# Patient Record
Sex: FEMALE | Race: WHITE | Employment: FULL TIME
[De-identification: names, ages, dates, MRNs, and addresses within clinical notes are randomized per-mention and may not be internally consistent; named-entity substitution may affect disease eponyms.]

---

## 2017-07-08 ENCOUNTER — HEALTH MAINTENANCE LETTER (OUTPATIENT)
Age: 37
End: 2017-07-08

## 2018-03-08 ENCOUNTER — HOSPITAL ENCOUNTER (INPATIENT)
Facility: CLINIC | Age: 38
LOS: 5 days | Discharge: PSYCHIATRIC OR SUBSTANCE USE FACILITY | DRG: 897 | End: 2018-03-13
Attending: EMERGENCY MEDICINE | Admitting: PSYCHIATRY & NEUROLOGY
Payer: COMMERCIAL

## 2018-03-08 DIAGNOSIS — F11.10 HEROIN ABUSE (H): ICD-10-CM

## 2018-03-08 PROBLEM — F19.10 CHEMICAL ABUSE (H): Status: ACTIVE | Noted: 2018-03-08

## 2018-03-08 LAB
AMPHETAMINES UR QL SCN: NEGATIVE
BARBITURATES UR QL: NEGATIVE
BENZODIAZ UR QL: NEGATIVE
CANNABINOIDS UR QL SCN: NEGATIVE
COCAINE UR QL: NEGATIVE
ETHANOL UR QL SCN: NEGATIVE
HCG UR QL: NEGATIVE
OPIATES UR QL SCN: POSITIVE

## 2018-03-08 PROCEDURE — 99285 EMERGENCY DEPT VISIT HI MDM: CPT | Mod: 25 | Performed by: EMERGENCY MEDICINE

## 2018-03-08 PROCEDURE — 80320 DRUG SCREEN QUANTALCOHOLS: CPT | Performed by: FAMILY MEDICINE

## 2018-03-08 PROCEDURE — 81025 URINE PREGNANCY TEST: CPT | Performed by: FAMILY MEDICINE

## 2018-03-08 PROCEDURE — 99285 EMERGENCY DEPT VISIT HI MDM: CPT | Mod: Z6 | Performed by: EMERGENCY MEDICINE

## 2018-03-08 PROCEDURE — HZ2ZZZZ DETOXIFICATION SERVICES FOR SUBSTANCE ABUSE TREATMENT: ICD-10-PCS | Performed by: PSYCHIATRY & NEUROLOGY

## 2018-03-08 PROCEDURE — 12800008 ZZH R&B CD ADULT

## 2018-03-08 PROCEDURE — 25000132 ZZH RX MED GY IP 250 OP 250 PS 637: Performed by: PSYCHIATRY & NEUROLOGY

## 2018-03-08 PROCEDURE — 80307 DRUG TEST PRSMV CHEM ANLYZR: CPT | Performed by: FAMILY MEDICINE

## 2018-03-08 RX ORDER — NALOXONE HYDROCHLORIDE 0.4 MG/ML
.1-.4 INJECTION, SOLUTION INTRAMUSCULAR; INTRAVENOUS; SUBCUTANEOUS
Status: DISCONTINUED | OUTPATIENT
Start: 2018-03-08 | End: 2018-03-13 | Stop reason: HOSPADM

## 2018-03-08 RX ORDER — BISACODYL 10 MG
10 SUPPOSITORY, RECTAL RECTAL DAILY PRN
Status: DISCONTINUED | OUTPATIENT
Start: 2018-03-08 | End: 2018-03-13 | Stop reason: HOSPADM

## 2018-03-08 RX ORDER — ONDANSETRON 4 MG/1
4 TABLET, ORALLY DISINTEGRATING ORAL EVERY 6 HOURS PRN
Status: DISCONTINUED | OUTPATIENT
Start: 2018-03-08 | End: 2018-03-13 | Stop reason: HOSPADM

## 2018-03-08 RX ORDER — BUPRENORPHINE 2 MG/1
2 TABLET SUBLINGUAL 4 TIMES DAILY PRN
Status: DISCONTINUED | OUTPATIENT
Start: 2018-03-08 | End: 2018-03-09

## 2018-03-08 RX ORDER — TRAZODONE HYDROCHLORIDE 50 MG/1
50 TABLET, FILM COATED ORAL
Status: DISCONTINUED | OUTPATIENT
Start: 2018-03-08 | End: 2018-03-13 | Stop reason: HOSPADM

## 2018-03-08 RX ORDER — IBUPROFEN 600 MG/1
600 TABLET, FILM COATED ORAL EVERY 6 HOURS PRN
Status: DISCONTINUED | OUTPATIENT
Start: 2018-03-08 | End: 2018-03-13 | Stop reason: HOSPADM

## 2018-03-08 RX ORDER — ALUMINA, MAGNESIA, AND SIMETHICONE 2400; 2400; 240 MG/30ML; MG/30ML; MG/30ML
30 SUSPENSION ORAL EVERY 4 HOURS PRN
Status: DISCONTINUED | OUTPATIENT
Start: 2018-03-08 | End: 2018-03-13 | Stop reason: HOSPADM

## 2018-03-08 RX ORDER — HYDROXYZINE HYDROCHLORIDE 25 MG/1
25 TABLET, FILM COATED ORAL EVERY 4 HOURS PRN
Status: DISCONTINUED | OUTPATIENT
Start: 2018-03-08 | End: 2018-03-13 | Stop reason: HOSPADM

## 2018-03-08 RX ORDER — ACETAMINOPHEN 325 MG/1
650 TABLET ORAL EVERY 4 HOURS PRN
Status: DISCONTINUED | OUTPATIENT
Start: 2018-03-08 | End: 2018-03-13 | Stop reason: HOSPADM

## 2018-03-08 RX ADMIN — TRAZODONE HYDROCHLORIDE 50 MG: 50 TABLET ORAL at 20:21

## 2018-03-08 ASSESSMENT — ENCOUNTER SYMPTOMS
COUGH: 0
EYE REDNESS: 0
DIARRHEA: 0
HEADACHES: 0
NAUSEA: 0
SORE THROAT: 0
SHORTNESS OF BREATH: 0
ARTHRALGIAS: 0
ABDOMINAL PAIN: 0
CHILLS: 0
COLOR CHANGE: 0
VOMITING: 0
FEVER: 0
RHINORRHEA: 1

## 2018-03-08 NOTE — ED PROVIDER NOTES
History     Chief Complaint   Patient presents with     Addiction Problem     Seeking detox from oxycodone/percocet, approximately 60mg/day but does not use daily, patient snorts medication.  Patient has history of snorting heroin, 2 days ago attempted IV for the first time.  Patient has been using heroin $50-$100/day for the past few months. Patient has bed reserved and intake aware.     The history is provided by the patient.     Sonja Joe is a 37 year old female with a history of substance abuse, depression, anxiety who presents to the Emergency Department today for admission to detox from heroin.  The patient reports that she called ahead and they do have a bed on hold for her.  The patient reports that she has been using about 0.5 g of heroin a day ($40-$100 worth).  Her last use was this morning.  The patient reports that she has been using heroin since last December, about 3 months.  She states that she typically snorts it, however, 2 days ago she used IV for the first time and states that at that moment she realized that she needed help.  She does report that she has a history of opiate abuse but had been sober for 2.5 years prior to December.  She reports that she had a roommate who also uses heroin so it gave her easy access and she knew where to get it from.  She does note that she has since moved out, and now lives with her parents.  She does also note that she snorted meth once a few weeks ago.  She denies the abuse of any prescription drugs.  Patient also denies use of alcohol.  Patient denies any suicidal ideation.  The patient reports that she has received treatment in the past at [facility]. She notes that her typical withdrawals or chills, vomiting, diarrhea, and anxiety.  She denies experiencing any of those withdrawal symptoms currently.  Patient's last menstrual period was 3 weeks ago.    I have reviewed the Medications, Allergies, Past Medical and Surgical History, and Social History in  the [employer] system.    Past Medical History:   Diagnosis Date     Anxiety      CARDIOVASCULAR SCREENING; LDL GOAL LESS THAN 160 3/13/2012     Depressive disorder      NO ACTIVE PROBLEMS      Substance abuse     opiates       Past Surgical History:   Procedure Laterality Date     NO HISTORY OF SURGERY         No family history on file.    Social History   Substance Use Topics     Smoking status: Current Some Day Smoker     Smokeless tobacco: Never Used      Comment: Intermoittent smoking     Alcohol use No       No current facility-administered medications for this encounter.      No current outpatient prescriptions on file.        Allergies   Allergen Reactions     No Known Drug Allergies       Review of Systems   Constitutional: Negative for chills and fever.   HENT: Positive for rhinorrhea. Negative for congestion and sore throat.    Eyes: Negative for redness.   Respiratory: Negative for cough and shortness of breath.    Cardiovascular: Negative for chest pain.   Gastrointestinal: Negative for abdominal pain, diarrhea, nausea and vomiting.   Musculoskeletal: Negative for arthralgias.   Skin: Negative for color change.   Neurological: Negative for headaches.   Psychiatric/Behavioral: Negative for suicidal ideas.   All other systems reviewed and are negative.      Physical Exam   BP: 131/89  Pulse: 75  Temp: 97.9  F (36.6  C)  Resp: 16  Weight: 135.2 kg (298 lb 1.6 oz)  SpO2: 96 %      Physical Exam   Constitutional: No distress.   HENT:   Head: Atraumatic.   Mouth/Throat: Oropharynx is clear and moist. No oropharyngeal exudate.   Eyes: Pupils are equal, round, and reactive to light. No scleral icterus.   Cardiovascular: Normal heart sounds and intact distal pulses.    Pulmonary/Chest: Breath sounds normal. No respiratory distress.   Abdominal: Soft. Bowel sounds are normal. There is no tenderness.   Musculoskeletal: She exhibits no edema or tenderness.   Skin: Skin is warm. No rash noted. She is not diaphoretic.    Track marks and bruising B AC regions, no sign of cellulitis or abscess   Psychiatric:   Casually dressed. Alert, cooperative, pleasant. Mood euthymic. No SI. No psychosis.  Seems quite forthcoming with information.  Insight good.    Nursing note and vitals reviewed.      ED Course   3:39 PM  The patient was seen and examined by Dr. Hood in Room 07.    ED Course     Procedures          Critical Care time:  none              Results for orders placed or performed during the hospital encounter of 03/08/18 (from the past 24 hour(s))   HCG qualitative urine   Result Value Ref Range    HCG Qual Urine Negative NEG^Negative          Assessments & Plan (with Medical Decision Making)   This is a very pleasant 37-year-old heroin abuser who presents to the Emergency Department for detox.  She has been through treatment before twice through [facility], had 2-1/2 years of sobriety until December 2017.  Admits to snorting heroin, estimates $40-$100 per day.  She started using/relapsed again when she was living with a roommate who is also using.  Since that time she has moved out of that environment and is currently living with her parents.  2 days ago she attempted to shoot heroin for the first time and this is what has prompted her to recognize that she needs help.  Admits to snorting meth 2 weeks ago, otherwise denies other drug use, no current suicidal ideation.  Last use was this morning.  She is pleasant and alert, cooperative, seems very forthcoming.  She did call ahead for a bed.  She is medically clear and voluntary.  I have ordered a urine tox and UPT for her.  We will admit her to detox as soon as those arrangements can be made for her.     I have reviewed the nursing notes.    I have reviewed the findings, diagnosis, plan and need for follow up with the patient.    New Prescriptions    No medications on file       Final diagnoses:   Heroin abuse   I, Rajan Vasques, am serving as a trained medical scribe to  document services personally performed by Merna Hood MD, based on the provider's statements to me.   I, Merna Hood MD, was physically present and have reviewed and verified the accuracy of this note documented by Rajan Vasques.     3/8/2018   [facility], [place], EMERGENCY DEPARTMENT     Merna Hood MD  03/08/18 0372

## 2018-03-08 NOTE — IP AVS SNAPSHOT
[facility]    [address] [place] [address]    Phone:  [phone]                                       After Visit Summary   3/8/2018    Sonja Joe    MRN: [id number]           After Visit Summary Signature Page     I have received my discharge instructions, and my questions have been answered. I have discussed any challenges I see with this plan with the nurse or doctor.    ..........................................................................................................................................  Patient/Patient Representative Signature      ..........................................................................................................................................  Patient Representative Print Name and Relationship to Patient    ..................................................               ................................................  Date                                            Time    ..........................................................................................................................................  Reviewed by Signature/Title    ...................................................              ..............................................  Date                                                            Time

## 2018-03-08 NOTE — PHARMACY-ADMISSION MEDICATION HISTORY
Admission medication history for the March 8, 2018 admission is complete.     Medication history interview sources: Patient    Medication compliance: N/A - patient not prescribed medications    Changes made to PTA medication list (reason)  Added: none  Deleted: none  Changed: none    Additional medication history information (including reliability of information, actions taken by pharmacist):  - Patient denies taking/being prescribed prescription medications and over-the-counter (OTC) products such as vitamins, sleep aids, etc.    - pt look ibuprofen yesterday, but this is not a routine medication for her    Prior to Admission medications    Not on File     Time spent: 5 minutes    Medication history completed by:   Fadia Mason, Ricardo  [facility]  Available daily from 1-9 PM: phone [phone], pager [phone]

## 2018-03-08 NOTE — IP AVS SNAPSHOT
MRN:[id number]                      After Visit Summary   3/8/2018    Sonja Joe    MRN: [id number]           Thank you!     Thank you for choosing [facility] for your care. Our goal is always to provide you with excellent care.        Patient Information     Date Of Birth          1980        Designated Caregiver       Most Recent Value    Caregiver    Will someone help with your care after discharge? no      About your hospital stay     You were admitted on:  March 8, 2018 You last received care in the:  [facility]    You were discharged on:  March 13, 2018       Who to Call     For medical emergencies, please call 911.  For non-urgent questions about your medical care, please call your primary care provider or clinic, [phone]          Attending Provider     Provider Specialty    Merna Hood MD Emergency Medicine    Sandro Cheng MD Psychiatry       Primary Care Provider Office Phone # Fax #    Marvel Beth Singh [phone]      Your next 10 appointments already scheduled     Mar 13, 2018  1:00 PM CDT   Treatment with LP/DOP ADMISSIONS   [facility] ([facility])    [address] [place] [id number] [phone]              Further instructions from your care team       Behavioral Discharge Planning and Instructions  THANK YOU FOR CHOOSING THE [place] [address] [address] [facility]    Summary: You were admitted to [facility] on 3/8/18 for detoxification from opiates.  A medical exam was performed that included lab work. You have met with a  and been approved for admission to [facility].  Please take care and make your recovery a priority Sonja! It was a pleasure working with you and the treatment team wishes you the very best in your recovery!  ~Baljit    Recommendation:  Residential Treatment, psychotherapy, sober support  group engagement and active work with a sponsor or  through [place] [facility].    Main Diagnoses: per Dr. Cheng psychiatrist.    DIAGNOSIS:  AXIS I:     1.  Opiate use, severe.   2.  Major depressive disorder, recurrent, without psychotic features.     Major Treatments, Procedures and Findings: Your opiate withdrawal was treated with buprenorphine. You are planning to continue on with a suboxone maintenance provider.    Please keep your suboxone in a safe place preferably in a locked box and out of reach of children/pets/others.     You were followed by Psychiatry and Medical. You met with Case Management to complete a chemical health assessment and for discharge planning. You were given a copy of your lab results which were reviewed with you. Please bring your lab results with you to your primary follow up appointment. Make your recovery a priority, Sonja.    Symptoms to Report: If  you experience feeling more anxiety, confusion, losing more sleep, mood declining or thoughts of suicide, notify your treatment team or notify your primary physician. IF ANY OF THE SYMPTOMS YOU ARE EXPERIENCING ARE A MEDICAL EMERGENCY CALL 911 IMMEDIATELY.    Lifestyle Adjustment: Adjust your lifestyle to get adequate sleep, relaxation, exercise and  good nutrition. Continue to develop healthy coping skills to  decrease stress and promote a sober living environment. No use of alcohol, illegal drugs or addictive medications other than what is currently prescribed. AA, NA, and  Sponsor are excellent resources for support.    There has been an increase in opioid overdoses and deaths recently . After even a short period of no drug use a persons  tolerance level to using drugs is decreased.   It is not safe to think that you can use the same amount of drugs as you did prior to coming into the hospital. Also the mixing of alcohol and/or multiple drugs increases the risk of overdose.     Returning to your drug using  "environment and contact with your drug using friends puts you at high risk for certain relapse.     Keeping hands clean is one of the most important steps we can take to avoid getting sick and spreading germs to others.  Please wash your hands frequently.     Medical Provider Follow Up: you are currently in the process of obtaining a suboxone maintaince dr and are awaiting calls back. You stated you will be following up with this at [facility] plus    Please take this discharge folder with you to all your follow up appointments as it contains your lab results, diagnosis, medication list and discharge recommendations.     Support Group:  AA/NA and Sponsor contact/support    Resources:  Crisis Intervention: 904.888.1162 or 721-277-1374 (TTY: 268.629.2875).  Call anytime for help.  Suicide Awareness Voices of Education (SAVE) (www.save.org): 032-942-PBRR (4633)  National Suicide Prevention Line (www.mentalhealthmn.org): 599-292-FELV (4015)  National [place] on Mental Illness (www.[place].will.org): 572.649.8729 or 333-375-8835.  Alcoholics Anonymous (www.alcoholics-anonymous.org): Or local information can be found 24 hours a day at:   AA Intergroup service office in [place] (http://www.aastpaul.org/) [phone]  AA Intergroup service office in [facility]: [phone]. (http://www.a[place].org/)  Narcotics Anonymous (www.nam[place].org)1-136.810.1867   [facility] Behavioral Intake [phone]    [employer] is a suicide prevention resource for residents in [place].  We can help you with relationship issues, general mental health, and suicide.  We are free, confidential, and here for you 24/7/365.  1. Text \"Life\" to 73401  2. Wait for a trained crisis counselor to respond to your text  3. Respond and start the conversation about what you are concerned about   If you believe a person s life is in imminent danger, call 911.    [facility] ([facility])  [facility] connects people " "seeking recovery to resources that help foster and sustain long-term recovery.  Whether you are seeking resources for treatment, transportation, housing, job training, education, health care or other pathways to recovery, [facility] is a great place to start.  [phone]. Www.[facility].org    General Medication Instructions:   See your medication sheet(s) for instructions. Take all medicines as directed.  Make no changes unless your doctor directs them. Go to all your doctor visits. Be sure to have all your required lab tests. This way, your medicines can be refilled in timely fashion. Do not use any drugs not prescribed by your provider. AA/NA and Sponsors are excellent resources for support. Avoid alcohol.    Please Note: If you have any questions at anytime after you are discharged please call the [facility] detox unit 3AW unit at [phone].  [facility], Behavioral Intake [phone]  Please take this discharge folder with you to all your follow up appointments, it contains your lab results, diagnosis, medication list and discharge recommendations.      Medical Records [phone]      THANK YOU FOR CHOOSING THE [facility]                                 Pending Results     No orders found from 3/6/2018 to 3/9/2018.            Statement of Approval     Ordered          03/13/18 1019  I have reviewed and agree with all the recommendations and orders detailed in this document.  EFFECTIVE NOW     Approved and electronically signed by:  Sandro Cheng MD             Admission Information     Date & Time Provider Department Dept. Phone    3/8/2018 Sandro Cheng MD [facility] [phone]      Your Vitals Were     Blood Pressure Pulse Temperature Respirations Height Weight    124/75 72 97.1  F (36.2  C) (Oral) 16 1.753 m (5' 9\") 135.2 kg (298 lb)    Last Period Pulse Oximetry BMI (Body Mass Index)       " "      02/15/2018 (Approximate) 93% 44.01 kg/m2         [employer] Information     [employer] lets you send messages to your doctor, view your test results, renew your prescriptions, schedule appointments and more. To sign up, go to [email or web address] . Click on \"Log in\" on the left side of the screen, which will take you to the Welcome page. Then click on \"Sign up Now\" on the right side of the page.     You will be asked to enter the access code listed below, as well as some personal information. Please follow the directions to create your username and password.     Your access code is: [email or web address]  Expires: 2018 12:53 PM     Your access code will  in 90 days. If you need help or a new code, please call your [place] clinic or [phone].        Care EveryWhere ID     This is your Care EveryWhere ID. This could be used by other organizations to access your [place] medical records  [id number]        Equal Access to Services     KHOI LOGAN : Hadii davina curry hadamandeepo Soalex, waaxda luqadaha, qaybta kaalmada adealejoyalaury, rubio perez . So [facility] [phone].    ATENCIÓN: Si habla español, tiene a dominique disposición servicios gratuitos de asistencia lingüística. Llame al [phone].    We comply with applicable federal civil rights laws and [place] laws. We do not discriminate on the basis of race, color, national origin, age, disability, sex, sexual orientation, or gender identity.               Review of your medicines      START taking        Dose / Directions    buprenorphine HCl-naloxone HCl 8-2 MG per film   Commonly known as:  SUBOXONE   Used for:  Heroin abuse        Dose:  1 Film   Place 1 Film under the tongue daily   Quantity:  7 Film   Refills:  0       FLUoxetine 10 MG tablet   Commonly known as:  PROzac   Used for:  Heroin abuse        Dose:  30 mg   Take 3 tablets (30 mg) by mouth daily   Quantity:  90 tablet   Refills:  0       nicotine polacrilex 2 MG gum "   Commonly known as:  NICORETTE   Used for:  Heroin abuse        Dose:  2-4 mg   Place 1-2 each (2-4 mg) inside cheek every hour as needed for smoking cessation   Quantity:  30 tablet   Refills:  1       traZODone 50 MG tablet   Commonly known as:  DESYREL   Used for:  Heroin abuse        Dose:  50 mg   Take 1 tablet (50 mg) by mouth nightly as needed for sleep   Quantity:  30 tablet   Refills:  0            Where to get your medicines      These medications were sent to [facility] [place], [place] - [address]  [address] [facility], [facility] [address]     Phone:  [phone]     FLUoxetine 10 MG tablet    nicotine polacrilex 2 MG gum    traZODone 50 MG tablet         Some of these will need a paper prescription and others can be bought over the counter. Ask your nurse if you have questions.     Bring a paper prescription for each of these medications     buprenorphine HCl-naloxone HCl 8-2 MG per film                Protect others around you: Learn how to safely use, store and throw away your medicines at www.disposemymeds.org.             Medication List: This is a list of all your medications and when to take them. Check marks below indicate your daily home schedule. Keep this list as a reference.      Medications           Morning Afternoon Evening Bedtime As Needed    buprenorphine HCl-naloxone HCl 8-2 MG per film   Commonly known as:  SUBOXONE   Place 1 Film under the tongue daily                                FLUoxetine 10 MG tablet   Commonly known as:  PROzac   Take 3 tablets (30 mg) by mouth daily   Last time this was given:  30 mg on 3/13/2018  9:10 AM                                nicotine polacrilex 2 MG gum   Commonly known as:  NICORETTE   Place 1-2 each (2-4 mg) inside cheek every hour as needed for smoking cessation   Last time this was given:  4 mg on 3/10/2018  8:45 AM                                traZODone 50 MG tablet   Commonly known as:  DESYREL   Take 1 tablet  (50 mg) by mouth nightly as needed for sleep   Last time this was given:  50 mg on 3/12/2018  9:33 PM

## 2018-03-08 NOTE — ED NOTES
Patient seeking detox from opiates, reports infrequent snorting of oxycodone/percocet (approximately 60mg/day when used), reports daily use of heroin for the past 3 months, typically snorting but the past 2 days has attempted to use IV.  Patient reports last use of heroin was around 0830 this morning, denies SI/HI.    Patient has detox bed reserved and intake aware.

## 2018-03-09 LAB
ALBUMIN SERPL-MCNC: 3.5 G/DL (ref 3.4–5)
ALP SERPL-CCNC: 130 U/L (ref 40–150)
ALT SERPL W P-5'-P-CCNC: 19 U/L (ref 0–50)
ANION GAP SERPL CALCULATED.3IONS-SCNC: 8 MMOL/L (ref 3–14)
AST SERPL W P-5'-P-CCNC: 9 U/L (ref 0–45)
BILIRUB SERPL-MCNC: 0.9 MG/DL (ref 0.2–1.3)
BUN SERPL-MCNC: 6 MG/DL (ref 7–30)
CALCIUM SERPL-MCNC: 8.6 MG/DL (ref 8.5–10.1)
CHLORIDE SERPL-SCNC: 108 MMOL/L (ref 94–109)
CO2 SERPL-SCNC: 23 MMOL/L (ref 20–32)
CREAT SERPL-MCNC: 0.66 MG/DL (ref 0.52–1.04)
ERYTHROCYTE [DISTWIDTH] IN BLOOD BY AUTOMATED COUNT: 14.7 % (ref 10–15)
GFR SERPL CREATININE-BSD FRML MDRD: >90 ML/MIN/1.7M2
GLUCOSE SERPL-MCNC: 96 MG/DL (ref 70–99)
HCT VFR BLD AUTO: 40.8 % (ref 35–47)
HCV AB SERPL QL IA: NONREACTIVE
HGB BLD-MCNC: 12.6 G/DL (ref 11.7–15.7)
HIV 1+2 AB+HIV1 P24 AG SERPL QL IA: NONREACTIVE
MCH RBC QN AUTO: 24.9 PG (ref 26.5–33)
MCHC RBC AUTO-ENTMCNC: 30.9 G/DL (ref 31.5–36.5)
MCV RBC AUTO: 81 FL (ref 78–100)
PLATELET # BLD AUTO: 301 10E9/L (ref 150–450)
POTASSIUM SERPL-SCNC: 4 MMOL/L (ref 3.4–5.3)
PROT SERPL-MCNC: 7.2 G/DL (ref 6.8–8.8)
RBC # BLD AUTO: 5.07 10E12/L (ref 3.8–5.2)
SODIUM SERPL-SCNC: 139 MMOL/L (ref 133–144)
TSH SERPL DL<=0.005 MIU/L-ACNC: 1.13 MU/L (ref 0.4–4)
WBC # BLD AUTO: 9 10E9/L (ref 4–11)

## 2018-03-09 PROCEDURE — 99207 ZZC DOWN CODE DUE TO SUBSEQUENT EXAM: CPT | Performed by: PSYCHIATRY & NEUROLOGY

## 2018-03-09 PROCEDURE — 12800012 ZZH R&B CD MH INTERMEDIATE ADULT

## 2018-03-09 PROCEDURE — 25000132 ZZH RX MED GY IP 250 OP 250 PS 637: Performed by: PSYCHIATRY & NEUROLOGY

## 2018-03-09 PROCEDURE — 80053 COMPREHEN METABOLIC PANEL: CPT | Performed by: PSYCHIATRY & NEUROLOGY

## 2018-03-09 PROCEDURE — 86803 HEPATITIS C AB TEST: CPT | Performed by: PSYCHIATRY & NEUROLOGY

## 2018-03-09 PROCEDURE — 85027 COMPLETE CBC AUTOMATED: CPT | Performed by: PSYCHIATRY & NEUROLOGY

## 2018-03-09 PROCEDURE — 99221 1ST HOSP IP/OBS SF/LOW 40: CPT | Mod: AI | Performed by: PSYCHIATRY & NEUROLOGY

## 2018-03-09 PROCEDURE — 36415 COLL VENOUS BLD VENIPUNCTURE: CPT | Performed by: PSYCHIATRY & NEUROLOGY

## 2018-03-09 PROCEDURE — 99222 1ST HOSP IP/OBS MODERATE 55: CPT | Performed by: NURSE PRACTITIONER

## 2018-03-09 PROCEDURE — 87389 HIV-1 AG W/HIV-1&-2 AB AG IA: CPT | Performed by: PSYCHIATRY & NEUROLOGY

## 2018-03-09 PROCEDURE — 99207 ZZC CONSULT E&M CHANGED TO INITIAL LEVEL: CPT | Performed by: NURSE PRACTITIONER

## 2018-03-09 PROCEDURE — 84443 ASSAY THYROID STIM HORMONE: CPT | Performed by: PSYCHIATRY & NEUROLOGY

## 2018-03-09 RX ORDER — LOPERAMIDE HCL 2 MG
2 CAPSULE ORAL 4 TIMES DAILY PRN
Status: DISCONTINUED | OUTPATIENT
Start: 2018-03-09 | End: 2018-03-13 | Stop reason: HOSPADM

## 2018-03-09 RX ORDER — BUPRENORPHINE 2 MG/1
2 TABLET SUBLINGUAL 4 TIMES DAILY
Status: DISCONTINUED | OUTPATIENT
Start: 2018-03-09 | End: 2018-03-13 | Stop reason: HOSPADM

## 2018-03-09 RX ORDER — SIMETHICONE 80 MG
80 TABLET,CHEWABLE ORAL EVERY 6 HOURS PRN
Status: DISCONTINUED | OUTPATIENT
Start: 2018-03-09 | End: 2018-03-13 | Stop reason: HOSPADM

## 2018-03-09 RX ORDER — FLUOXETINE 10 MG/1
20 TABLET, FILM COATED ORAL DAILY
Status: DISCONTINUED | OUTPATIENT
Start: 2018-03-09 | End: 2018-03-13

## 2018-03-09 RX ADMIN — BUPRENORPHINE HCL 2 MG: 2 TABLET SUBLINGUAL at 08:54

## 2018-03-09 RX ADMIN — IBUPROFEN 600 MG: 600 TABLET ORAL at 05:37

## 2018-03-09 RX ADMIN — TRAZODONE HYDROCHLORIDE 50 MG: 50 TABLET ORAL at 20:36

## 2018-03-09 RX ADMIN — BUPRENORPHINE HCL 2 MG: 2 TABLET SUBLINGUAL at 11:43

## 2018-03-09 RX ADMIN — HYDROXYZINE HYDROCHLORIDE 25 MG: 25 TABLET ORAL at 05:37

## 2018-03-09 RX ADMIN — BUPRENORPHINE HCL 2 MG: 2 TABLET SUBLINGUAL at 20:36

## 2018-03-09 RX ADMIN — BUPRENORPHINE HCL 2 MG: 2 TABLET SUBLINGUAL at 16:15

## 2018-03-09 RX ADMIN — FLUOXETINE 20 MG: 10 TABLET, FILM COATED ORAL at 11:41

## 2018-03-09 RX ADMIN — NICOTINE POLACRILEX 4 MG: 2 GUM, CHEWING ORAL at 18:19

## 2018-03-09 ASSESSMENT — ACTIVITIES OF DAILY LIVING (ADL)
DRESS: STREET CLOTHES
ORAL_HYGIENE: INDEPENDENT
GROOMING: INDEPENDENT
LAUNDRY: WITH SUPERVISION

## 2018-03-09 NOTE — PLAN OF CARE
"Problem: Substance Withdrawal  Goal: Substance Withdrawal  Signs and symptoms of listed problems will be absent or manageable.   Outcome: No Change  S: Sonja \"Debra\" is a 38 yo female who comes to  seeking detox from opioids. She states that she relapsed Dec 2017 after 2 years of recovery.  B:  She has been snorting Oxycontin 60 mg or Heroin 1/2 gram daily, she tried IV heroin 2 days ago for the first time and \"grossed out and knew I needed help.\" Last opiate use 3/8/18 at 0830.  She has been to [facility] 2 2013 (sober 1 year) and 2015 (sober 2 years). 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 She has a 5 year old son whom is with his father. A: Patient does not appear to be in serious opiate withdrawal at this time AEB opiate score of 4.  She endorses depression, denies SI or any history of SI, h/o anxiety currently denies.        R: Obtained admission orders. Provided with dinner. Educated on Unit routines and expectations.            "

## 2018-03-09 NOTE — CONSULTS
Internal Medicine Consult - Initial Visit       Sonja Joe MRN# [id number]   YOB: 1980 Age: 37 year old   Date of Admission: 3/8/2018  PCP: Marvel Singh  Date of Service: 3/9/2018    Referring Provider: Sandro Cheng MD  Reason for Consult: Medical co-management of detox          Assessment and Recommendations:   Sonja Joe is a 37 year old female with a history of depression and polysubstance abuse admitted for detoxification from opiates.      # Detoxification from opiates - Using about 1/2 g heroin daily intranasally.  Used IV for the first time on 3/7.  No hx of sharing needles.     - HIV, HCV pending   - Further management per Psychiatry     # Depression - Mood stable.    - Continue PTA Prozac    # Abdominal pain  # Diarrhea  Likely d/t withdrawals.  No recent abx use.  No fevers.   - Imodium, simethicone PRN   - Please contact medicine if symptoms persist or if accompanied by fevers       Medicine will follow along pending lab results.  Thank you for this consult.     Pricila Morrison CNP  Hospitalist Service   Pager: [phone]       Reason for Visit:   Medical co-management of detox          History of Present Illness:   History is obtained from the patient and medical record.     This patient is a 37 year old female with a history of depression and polysubstance abuse admitted for detoxification from opiates.      Internal Medicine service was asked to see patient for assistance with medical co-management of detoxification from opiates.  Sonja tells me that she has been using about 1/2 gram of heroin for the last month.  She had exclusively been snorting it until Wednesday (3/7) this week when she tried IV heroin for the first time.  This prompted her to seek treatment.  She denies sharing needles on this occasion.      Today she reports diarrhea and abdominal pain which she attributes to withdrawals.  She denies any flu symptoms.  She denies fevers, chills,  headaches, dizziness, cough, chest pain, dyspnea, nausea, vomiting, constipation, and dysuria.          Review of Systems:   A 10 point ROS was performed and negative unless otherwise noted in HPI.           Past Medical History:   Reviewed and updated in Epic.  Past Medical History:   Diagnosis Date     Anxiety      CARDIOVASCULAR SCREENING; LDL GOAL LESS THAN 160 3/13/2012     Depressive disorder      NO ACTIVE PROBLEMS      Substance abuse     opiates             Past Surgical History:   Reviewed and updated in Epic.  Past Surgical History:   Procedure Laterality Date     NO HISTORY OF SURGERY               Social History:   Reviewed and updated in [facility].  Social History     Social History     Marital status: Legally      Spouse name: Rajan     Number of children: 1     Years of education: N/A     Occupational History           [employer]     Social History Main Topics     Smoking status: Current Some Day Smoker     Smokeless tobacco: Never Used      Comment: Intermoittent smoking     Alcohol use No     Drug use: Yes     Special: Opiates, IV      Comment: typically snorts opiates but has attempted to inject     Sexual activity: Yes     Partners: Male     Other Topics Concern     Parent/Sibling W/ Cabg, Mi Or Angioplasty Before 65f 55m? No     Social History Narrative              Family History:   Reviewed and updated in Epic.  No family history on file.          Allergies:     Allergies   Allergen Reactions     No Known Drug Allergies              Medications:     Current Facility-Administered Medications   Medication     FLUoxetine (PROzac) tablet 20 mg     hydrOXYzine (ATARAX) tablet 25 mg     acetaminophen (TYLENOL) tablet 650 mg     alum & mag hydroxide-simethicone (MYLANTA ES/MAALOX  ES) suspension 30 mL     magnesium hydroxide (MILK OF MAGNESIA) suspension 30 mL     bisacodyl (DULCOLAX) Suppository 10 mg     traZODone (DESYREL) tablet 50 mg     ondansetron  "(ZOFRAN-ODT) ODT tab 4 mg     ibuprofen (ADVIL/MOTRIN) tablet 600 mg     buprenorphine (SUBUTEX) sublingual tablet 2 mg     naloxone (NARCAN) injection 0.1-0.4 mg            Physical Exam:   Blood pressure 130/87, pulse 85, temperature 98.5  F (36.9  C), temperature source Tympanic, resp. rate 16, height 1.753 m (5' 9\"), weight 135.2 kg (298 lb), last menstrual period 02/15/2018, SpO2 93 %.  Body mass index is 44.01 kg/(m^2).    GENERAL: Alert and oriented x 3. Well nourished, well developed.  Large body habitus.  No acute distress.    HEENT: Normocephalic, atraumatic. Anicteric sclera. Mucous membranes moist.   CV: RRR. S1, S2. No murmurs appreciated.   RESPIRATORY: Effort normal on room air. Lungs CTAB with no wheezing, rales, or rhonchi.   GI: Abdomen soft and non distended, bowel sounds present x all 4 quadrants. No tenderness, rebound, or guarding.   NEUROLOGICAL: No focal deficits. Follows commands.  Strength 5/5 in upper and lower extremities.   MUSCULOSKELETAL: No joint swelling or tenderness. Moves all extremities.   EXTREMITIES: No gross deformities. No peripheral edema. Intact bilateral pedal pulses.   SKIN: Grossly warm, dry, and intact. No jaundice. No rashes.             Data:   I personally reviewed the following studies:    ROUTINE IP LABS (Last four results)  CMP     Recent Labs  Lab 03/09/18  0802      POTASSIUM 4.0   CHLORIDE 108   CO2 23   ANIONGAP 8   GLC 96   BUN 6*   CR 0.66   MAURICIO 8.6   PROTTOTAL 7.2   ALBUMIN 3.5   BILITOTAL 0.9   ALKPHOS 130   AST 9   ALT 19     CBC   Recent Labs  Lab 03/09/18  0802   WBC 9.0   RBC 5.07   HGB 12.6   HCT 40.8   MCV 81   MCH 24.9*   MCHC 30.9*   RDW 14.7        INR No lab results found in last 7 days.      Unresulted Labs Ordered in the Past 30 Days of this Admission     Date and Time Order Name Status Description    3/9/2018 0030 HIV Antigen Antibody Combo In process     3/9/2018 0030 Hepatitis C Screen Reflex to HCV RNA Quant and Genotype In " process

## 2018-03-09 NOTE — PROGRESS NOTES
Case Management Note  3/9/2018    Writer met with pt to initiate discharge planning. Pt reports she definitely plans to go to treatment. She is hoping to have a Rule 25 completed and follow those recommendations for treatment. Pt reports she last had a assessment completed at [facility] in 2015. Writer encouraged pt to complete her intake paperwork for assessment and referral purposes. Pt reports she is not feeling well, but plans to get started on this once she starts feeling better. Case management to continue.    Weekend  - Please check in with pt to see if she has completed her intake paperwork. If completed, complete Rule 25 assessment and make referral to residential treatment program.    Baljit Knight MA, LADC

## 2018-03-09 NOTE — PLAN OF CARE
Problem: Substance Withdrawal  Goal: Substance Withdrawal  Signs and symptoms of listed problems will be absent or manageable. 1. Detoxification from opiates using buprenorphine   2. Pt will complete assessment paperwork  3. Pt will meet with  for evaluation and discharge planning  4. Pt oral intake will be greater than 75% of meals to meet estimated needs  5. Pt will be provided opioid prevention resources  6. Physical examination by MD and Lab evaluation     Pt being monitored for opiate withdrawal. Mild withdrawal symptoms in am. Pt received first dose of buprenorphine 2 mg in am. Pt reported receiving relief of many opiate withdrawal symptoms. Pt will receive buprenorphine 2 mg QID today. Pt states she is interested in going to [facility] treatment and doing suboxone maintenance. Pt reports feeling anxious rating her anxiety level a 7-8 on a 0-10 severe scale. Pt was started back on Prozac. Pt out on unit working on assessment paperwork.

## 2018-03-09 NOTE — PLAN OF CARE
Behavioral Team Discussion: (3/9/2018)    Continued Stay Criteria/Rationale: Patient admitted for Chemical Use Issues.  Plan: The following services will be provided to the patient; psychiatric assessment, medication management, therapeutic milieu, individual and group support, and skills groups.   Participants: 3A Provider: Dr. Sandro Cheng MD; 3A RN's: Renetta Santoyo, RN, Eliseo Dewey, RN, Darlin Greenwood, RN and Sally Selisker, RN; 3A CM's: Natalia Jolley, Lesley Burns  and Baljit Knight.  Summary/Recommendation: Providers will assess today for treatment recommendations, discharge planning, and aftercare plans. CM will meet with pt for discharge planning.   Medical/Physical: Deferred (see medical notes).  Precautions:   Behavioral Orders   Procedures     Code 1 - Restrict to Unit     Routine Programming     As clinically indicated     Status 15     Every 15 minutes.     Withdrawal precautions     Rationale for change in precautions or plan: N/A  Progress: Improving.

## 2018-03-09 NOTE — H&P
"Admitted:     03/08/2018      INITIAL ADMISSION NOTE      IDENTIFYING INFORMATION:  The patient is a 37-year-old  female.  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  She is .  She has 1 child.      CHIEF COMPLAINT:  \"In and out of sobriety.\"      HISTORY OF PRESENT ILLNESS:  The patient began to abuse opiates at the age of 28.  It was oxys and by 30 it was a problem.  She was in treatment at age of 32.  She was sober for 1 year.  She went to [facility] ED in 2015.  She was also in [facility] ED and sober this time for 2 years.  She was never on Suboxone maintenance, but she was living with her roommate and at that time her roommate was using and she relapsed.  She has been using oxycodone, Percocet, approximately 60 mg per day.  She snorts them.  She has also been snorting heroin.  Two days ago she attempted IV and she realized that she needs to get help and brought herself here.  She has tolerance, withdrawal including chills, vomiting, diarrhea.  She has progressive use, loss of control, tried to use despite having negative consequences, marital difficulty, job difficulty, house, money.  She smokes 1 pack a week.  She does not beasley, does not use any other drugs.  She does have chronic depression.  She says her depression is worse right now, where she has loss of giovanna, appetite is down, isolates, energy is down.  She spends a lot of time in bed.  She previously did take Prozac and Wellbutrin.  She denies any suicidal or homicidal ideation.  Denied any auditory or visual hallucinations, denies any tiburcio, denies any PTSD.  She is now living at home and it is significantly hard for her because she describes that her father was not good to her.      PAST PSYCHIATRIC HISTORY:  She was never psychiatrically hospitalized.  She was in 2 treatments as described above.      PAST MEDICAL HISTORY:  A 10-point systems reviewed is negative.  However, she does have symptoms of opiate withdrawal, restlessness, she is " having nausea.       VITAL SIGNS:  The patient's vitals are as below:  Temperature of 98.5, pulse of 85, respiratory rate of 16, blood pressure of 130/87.      FAMILY HISTORY:  Three of her grandparents  from alcoholism.  Depression runs in her mother's side of the family.  Paternal cousin committed suicide.      SOCIAL HISTORY:  Born in [place].  Denied any abuse, living at home.  She has +12 years of education.      MENTAL STATUS EXAMINATION:  The patient is a 37-year-old  female who appears her stated age, adequate grooming, adequate hygiene, maintains good eye contact, cooperative.  No psychomotor abnormalities.  No gait problems.  Mood is depressed.  Affect is congruent.  Speech is spontaneous, normal rate.  Does not have any suicidal or homicidal ideation, denies any auditory or visual hallucinations.  Alert and oriented x3.  Recent and remote memory, language, fund of knowledge are all adequate.  No loose associations.  The patient does not have any suicidal or homicidal ideation, plan or intent.      DIAGNOSIS:  AXIS I:     1.  Opiate use dx , severe.   2.  Major depressive disorder, recurrent, without psychotic features.      PLAN:  The patient wants to be detoxed off opiates using buprenorphine.  She is looking into Suboxone maintenance.  The patient wants to do some form of treatment.         JAISON CHAUDHARI MD             D: 2018   T: 2018   MT: FRANCISCO      Name:     CARLITA CHARLES   MRN:      [id number]        Account:      [id number]   :      1980        Admitted:     2018                   Document: [id number]

## 2018-03-09 NOTE — PROGRESS NOTES
03/08/18 1811   Patient Belongings   Did you bring any home meds/supplements to the hospital?  Yes   Disposition of meds  Sent to security/pharmacy per site process   Disposition of Belongings Other (see comment)   Belongings Search Yes   Clothing Search Yes   Second Staff Phu RN, Bina   General Info Comment See Notes     BIN: Tote bag, purse w/ loose papers, loose coins, boots, hat, jacket, black tote bag, blue hoodie with strings, pink hoodie with strings    Medication # [id number] delivered to nurse    Locked drawer: Phone,     Romel Sent to security-Knife, cutter  A               Admission:  I am responsible for any personal items that are not sent to the safe or pharmacy.  [facility] is not responsible for loss, theft or damage of any property in my possession.    Signature:  _________________________________ Date: _______  Time: _____                                              Staff Signature:  ____________________________ Date: ________  Time: _____      2nd Staff person, if patient is unable/unwilling to sign:    Signature: ________________________________ Date: ________  Time: _____     Discharge:  [facility] has returned all of my personal belongings:    Signature: _________________________________ Date: ________  Time: _____                                          Staff Signature:  ____________________________ Date: ________  Time: _____

## 2018-03-10 LAB
ALBUMIN UR-MCNC: 10 MG/DL
APPEARANCE UR: CLEAR
BACTERIA #/AREA URNS HPF: ABNORMAL /HPF
BILIRUB UR QL STRIP: NEGATIVE
COLOR UR AUTO: YELLOW
GLUCOSE UR STRIP-MCNC: NEGATIVE MG/DL
HGB UR QL STRIP: NEGATIVE
KETONES UR STRIP-MCNC: NEGATIVE MG/DL
LEUKOCYTE ESTERASE UR QL STRIP: ABNORMAL
MUCOUS THREADS #/AREA URNS LPF: PRESENT /LPF
NITRATE UR QL: NEGATIVE
PH UR STRIP: 6 PH (ref 5–7)
RBC #/AREA URNS AUTO: 2 /HPF (ref 0–2)
SOURCE: ABNORMAL
SP GR UR STRIP: 1.02 (ref 1–1.03)
SQUAMOUS #/AREA URNS AUTO: <1 /HPF (ref 0–1)
UROBILINOGEN UR STRIP-MCNC: 2 MG/DL (ref 0–2)
WBC #/AREA URNS AUTO: 1 /HPF (ref 0–5)

## 2018-03-10 PROCEDURE — 25000132 ZZH RX MED GY IP 250 OP 250 PS 637: Performed by: PSYCHIATRY & NEUROLOGY

## 2018-03-10 PROCEDURE — 12800012 ZZH R&B CD MH INTERMEDIATE ADULT

## 2018-03-10 PROCEDURE — 81001 URINALYSIS AUTO W/SCOPE: CPT | Performed by: PSYCHIATRY & NEUROLOGY

## 2018-03-10 PROCEDURE — 25000125 ZZHC RX 250: Performed by: PSYCHIATRY & NEUROLOGY

## 2018-03-10 RX ADMIN — HYDROXYZINE HYDROCHLORIDE 25 MG: 25 TABLET ORAL at 23:08

## 2018-03-10 RX ADMIN — FLUOXETINE 20 MG: 10 TABLET, FILM COATED ORAL at 08:45

## 2018-03-10 RX ADMIN — ONDANSETRON 4 MG: 4 TABLET, ORALLY DISINTEGRATING ORAL at 07:08

## 2018-03-10 RX ADMIN — TRAZODONE HYDROCHLORIDE 50 MG: 50 TABLET ORAL at 23:08

## 2018-03-10 RX ADMIN — BUPRENORPHINE HCL 2 MG: 2 TABLET SUBLINGUAL at 20:38

## 2018-03-10 RX ADMIN — NICOTINE POLACRILEX 4 MG: 2 GUM, CHEWING ORAL at 08:45

## 2018-03-10 RX ADMIN — BUPRENORPHINE HCL 2 MG: 2 TABLET SUBLINGUAL at 11:55

## 2018-03-10 RX ADMIN — TRAZODONE HYDROCHLORIDE 50 MG: 50 TABLET ORAL at 21:37

## 2018-03-10 RX ADMIN — BUPRENORPHINE HCL 2 MG: 2 TABLET SUBLINGUAL at 16:28

## 2018-03-10 RX ADMIN — BUPRENORPHINE HCL 2 MG: 2 TABLET SUBLINGUAL at 08:45

## 2018-03-10 ASSESSMENT — ACTIVITIES OF DAILY LIVING (ADL)
DRESS: STREET CLOTHES
LAUNDRY: WITH SUPERVISION
ORAL_HYGIENE: INDEPENDENT
GROOMING: INDEPENDENT

## 2018-03-10 NOTE — PLAN OF CARE
Problem: Substance Withdrawal  Goal: Substance Withdrawal  Signs and symptoms of listed problems will be absent or manageable. 1. Detoxification from opiates using buprenorphine   2. Pt will complete assessment paperwork  3. Pt will meet with  for evaluation and discharge planning  4. Pt oral intake will be greater than 75% of meals to meet estimated needs  5. Pt will be provided opioid prevention resources  6. Physical examination by MD and Lab evaluation      Outcome: Improving  Pt continues on scheduled buprenorphine. Pt reports mild opiate withdrawal symptoms. Pt reporting some anxiety rating her anxiety level a 6 on a 0-10 severe scale. Discharge plans pending. Pt participated in a am beading group. Out on unit.

## 2018-03-11 PROCEDURE — 12800012 ZZH R&B CD MH INTERMEDIATE ADULT

## 2018-03-11 PROCEDURE — 25000132 ZZH RX MED GY IP 250 OP 250 PS 637: Performed by: PSYCHIATRY & NEUROLOGY

## 2018-03-11 RX ADMIN — TRAZODONE HYDROCHLORIDE 50 MG: 50 TABLET ORAL at 22:16

## 2018-03-11 RX ADMIN — BUPRENORPHINE HCL 2 MG: 2 TABLET SUBLINGUAL at 12:52

## 2018-03-11 RX ADMIN — FLUOXETINE 20 MG: 10 TABLET, FILM COATED ORAL at 08:53

## 2018-03-11 RX ADMIN — HYDROXYZINE HYDROCHLORIDE 25 MG: 25 TABLET ORAL at 22:16

## 2018-03-11 RX ADMIN — BUPRENORPHINE HCL 2 MG: 2 TABLET SUBLINGUAL at 21:16

## 2018-03-11 RX ADMIN — BUPRENORPHINE HCL 2 MG: 2 TABLET SUBLINGUAL at 08:53

## 2018-03-11 RX ADMIN — BUPRENORPHINE HCL 2 MG: 2 TABLET SUBLINGUAL at 16:22

## 2018-03-11 ASSESSMENT — ACTIVITIES OF DAILY LIVING (ADL)
ORAL_HYGIENE: INDEPENDENT
GROOMING: INDEPENDENT
LAUNDRY: WITH SUPERVISION
DRESS: STREET CLOTHES

## 2018-03-11 NOTE — PLAN OF CARE
"Problem: Substance Withdrawal  Goal: Substance Withdrawal  Signs and symptoms of listed problems will be absent or manageable. 1. Detoxification from opiates using buprenorphine   2. Pt will complete assessment paperwork  3. Pt will meet with  for evaluation and discharge planning  4. Pt oral intake will be greater than 75% of meals to meet estimated needs  5. Pt will be provided opioid prevention resources  6. Physical examination by MD and Lab evaluation      Outcome: Improving  Pt out on unit. Attended unit group on coping skills for anxiety. Pt affect bright. Pt continues on scheduled buprenorphine. Minimal withdrawal symptoms. Pt will call on Monday to locate a suboxone provider that takes her insurance. Pt states she wants to do door to door for treatment. \"I need to listen to the professional's and I am will to do what ever they tell me.\". States she is a little tired as she took two doses of trazodone for sleep last night. Pt pleasant and cooperative.       "

## 2018-03-12 PROCEDURE — 99233 SBSQ HOSP IP/OBS HIGH 50: CPT | Performed by: PSYCHIATRY & NEUROLOGY

## 2018-03-12 PROCEDURE — 25000132 ZZH RX MED GY IP 250 OP 250 PS 637: Performed by: PSYCHIATRY & NEUROLOGY

## 2018-03-12 PROCEDURE — 12800012 ZZH R&B CD MH INTERMEDIATE ADULT

## 2018-03-12 RX ORDER — BUPRENORPHINE AND NALOXONE 8; 2 MG/1; MG/1
1 FILM, SOLUBLE BUCCAL; SUBLINGUAL DAILY
Qty: 7 FILM | Refills: 0 | Status: SHIPPED | OUTPATIENT
Start: 2018-03-12

## 2018-03-12 RX ADMIN — FLUOXETINE 20 MG: 10 TABLET, FILM COATED ORAL at 08:59

## 2018-03-12 RX ADMIN — BUPRENORPHINE HCL 2 MG: 2 TABLET SUBLINGUAL at 12:47

## 2018-03-12 RX ADMIN — BUPRENORPHINE HCL 2 MG: 2 TABLET SUBLINGUAL at 20:48

## 2018-03-12 RX ADMIN — BUPRENORPHINE HCL 2 MG: 2 TABLET SUBLINGUAL at 17:19

## 2018-03-12 RX ADMIN — HYDROXYZINE HYDROCHLORIDE 25 MG: 25 TABLET ORAL at 21:33

## 2018-03-12 RX ADMIN — TRAZODONE HYDROCHLORIDE 50 MG: 50 TABLET ORAL at 21:33

## 2018-03-12 RX ADMIN — BUPRENORPHINE HCL 2 MG: 2 TABLET SUBLINGUAL at 08:59

## 2018-03-12 ASSESSMENT — ACTIVITIES OF DAILY LIVING (ADL)
DRESS: STREET CLOTHES;INDEPENDENT
LAUNDRY: WITH SUPERVISION
ORAL_HYGIENE: INDEPENDENT
GROOMING: INDEPENDENT

## 2018-03-12 NOTE — PROGRESS NOTES
Case Management Note  3/12/2018    Writer met with pt to review completed intake paperwork. Rule 25 assessment and addendum completed for [facility]. Writer learned there are no female beds in [facility] for the next couple of days. [facility]. They have immediate openings in their treatment program. Writer spoke with Padma Logan at [facility]. Padma is not sure if [facility] is in network for for [facility], but encouraged writer to fax assessment in for approval/authorization. Writer faxed Rule 25 assessment to [facility] and [facility]. Case management to continue.    Writer received voicemail messages from [facility] and [facility]. [facility] is out of network for [facility]. Writer received a list of treatment programs in network for pt. Writer gave pt this list. Programs include [facility], [facility], [facility], [facility], [facility], [facility] and [facility].    Baljit Knight MA, [facility]

## 2018-03-12 NOTE — PROGRESS NOTES
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 (DSM)     Within the last month - I get up and go to work. I think about if I was going to get something or if I had money to get something (to get high). It depended on the day. Some days I would have my son. Other nights I would have to work. Some nights I hung out with friends and other nights I was driving around looking for drugs.      2B. How often do you spend more time than you planned using or use more than you planned? (DSM)     All the time when I am actively using.     3. How important is using to your social connections? Do many of your family or friends use?     Using was not important to my social connections.   Most of my family and friends do not use.     4A. Are you currently in a significant relationship? Yes.  4B. How long? 2 years    4C. Sexual Orientation: Heterosexual    5A. Who do you live with?  I live with my parents    5B. Tell me about their alcohol/drug use and mental health issues. My parents have mental health issues    5C. Are you concerned for your safety there? no.    5D. Are you concerned about the safety of anyone else who lives with you? no.    6A. Do you have children who live with you? Yes.  (Ask follow-up questions to determine the person s relationship and responsibility, both legal  and care giving; and what arrangements are made for supervision for the children when the person is not available.)  My 5 year old son spends 2 nights a week with me.    6B. Do you have children who do not live with you? Yes.  (Ask follow up questions to learn where the children are, who has custody and what the person s relationship and responsibility is with these children and what hopes the person has for his or her future with these children.) My 5 year old son spends 2 nights a week with me.    7A. Who supports you in making changes in your alcohol or drug use? What are they willing to do to support you? Who is upset or angry about you making changes in your alcohol or drug use? How big a problem is this for you?      My family and friends are supportive. I am sure some would help if I needed something and if they knew I was working hard on my sobriety.     7B. This table is provided to record information about the person s relationships and available support It is not necessary to ask each item; only to get a comprehensive picture of their support system.  How often can you count on the following people when you need someone?   Partner / Spouse Usually supportive   Parent(s)/Aunt(s)/Uncle(s)/Grandparents Always supportive   Sibling(s)/Cousin(s) Usually supportive   Child(an) Usually supportive   Other relative(s) N/A   Friend(s)/neighbor(s) Usually supportive   Child(an) s father(s)/mother(s) N/A   Support group member(s) Always supportive   Community of [place] members Usually supportive   /counselor/therapist/healer Usually supportive   Other (specify) N/A     8A. What is your current living situation?     I am currently living with my parents.    8B. What is your long term plan for where you will be living?     I do not have any long term plan at this time for where I will be living.    8C. Tell me about your living environment/neighborhood? Ask enough follow up questions to determine safety,  criminal activity, availability of alcohol and drugs, supportive or antagonistic to the person making changes.      Everything is safe and I have no concerns.     9. Criminal justice history: Gather current/recent history and any significant history related to substance use--Arrests? Convictions? Circumstances? Alcohol or drug involvement? Sentences? Still on probation or parole? Expectations of the court? Current court order? Any sex offenses - lifetime? What level? (DSM)    I had a DWI when I was 22 years old in [place]    10. What obstacles exist to participating in treatment? (Time off work, childcare, funding, transportation, pending assisted time, living situation)     None    Dimension VI Ratings   Recovery environment - The placing authority must use the criteria in Dimension VI to determine a client s recovery environment.   RISK DESCRIPTIONS - Severity ratin Client is engaged in structured, meaningful activity, but peers, family, significant other, and living environment are unsupportive, or there is criminal justice involvement by the client or among the client s peers, significant others, or in the client s living environment.    REASONS SEVERITY WAS ASSIGNED - (What support does the person have for making changes? What structure/stability does the person have in his or her daily life that will increase the likelihood that changes can be sustained? What problems exist in the person s environment that will jeopardize getting/staying clean and sober?)     Patient reports that her current living situation is supportive towards her recovery. Pt reports that she is currently living with her parents. Pt lacks a daily structure and meaningful activities that support recovery. Pt reports that she thinks she  is still employed and is not attending school at this time. Pt has strained relationships with family and friends due to her use. Pt lacks a sober support network. Pt reports that she has some legal  involvement for DWI when she was 22 years old and isn't on probation for it.         Client Choice/Exceptions   Would you like services specific to language, age, gender, culture, Scientologist preference, race, ethnicity, sexual orientation or disability?  No    What particular treatment choices and options would you like to have? Residential treatment.    Do you have a preference for a particular treatment program? Residential treatment.    Criteria for Diagnosis     Criteria for Diagnosis  DSM-5 Criteria for Substance Use Disorder  Instructions: Determine whether the client currently meets the criteria for Substance Use Disorder using the diagnostic criteria in the DSM-V pp.481-589. Current means during the most recent 12 months outside a facility that controls access to substances    Category of Substance Severity (ICD-10 Code / DSM 5 Code)     Alcohol Use Disorder NA   Cannabis Use Disorder NA   Hallucinogen Use Disorder NA   Inhalant Use Disorder NA   Opioid Use Disorder Severe   (F11.20) (304.00)   Sedative, Hypnotic, or Anxiolytic Use Disorder NA   Stimulant Related Disorder NA   Tobacco Use Disorder NA   Other (or unknown) Substance Use Disorder NA     Collateral Contact Summary   Number of contacts made: 2    Contact with referring person:  N/A.    If court related records were reviewed, summarize here: N/A    Information from collateral contacts supported/largely agreed with information from the client and associated risk ratings.    Rule 25 Assessment Summary and Plan   's Recommendation    1)  Complete a residential based/IOP W/ lodging or similar treatment program.   2)  Abstain from all mood-altering chemicals unless prescribed by a licensed provider.   3)  Attend weekly 12-step support group meetings.     4)  Actively work with a female sponsor or  through [employer] [facility] ([phone]).   5)  Follow all the recommendations of your treatment/medical providers.  6)  Remain law  "abiding.  7)  Patient may benefit from obtaining a full mental health evaluation.  8)  Patient may benefit from 1:1 psychotherapy due to mental health diagnosis         Collateral Contacts     Name:    Dr. AYDEE Cheng MD   Relationship:    3A Physician   Phone Number:    [phone] Releases:         \"The patient is a 37-year-old  female.  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  She is .  She has 1 child. The patient began to abuse opiates at the age of 28.  It was oxys and by 30 it was a problem.  She was in treatment at age of 32.  She was sober for 1 year.  She went to [facility] ED in 2015.  She was also in [facility] ED and sober this time for 2 years.  She was never on Suboxone maintenance, but she was living with her roommate and at that time her roommate was using and she relapsed.  She has been using oxycodone, Percocet, approximately 60 mg per day.  She snorts them.  She has also been snorting heroin.  Two days ago she attempted IV and she realized that she needs to get help and brought herself here.  She has tolerance, withdrawal including chills, vomiting, diarrhea.  She has progressive use, loss of control, tried to use despite having negative consequences, marital difficulty, job difficulty, house, money.  She smokes 1 pack a week.  She does not beasley, does not use any other drugs.  She does have chronic depression.  She says her depression is worse right now, where she has loss of giovanna, appetite is down, isolates, energy is down.  She spends a lot of time in bed.  She previously did take Prozac and Wellbutrin.  She denies any suicidal or homicidal ideation.  Denied any auditory or visual hallucinations, denies any tiburcio, denies any PTSD.  She is now living at home and it is significantly hard for her because she describes that her father was not good to her.\"    Collateral Contacts     Name    Dr. AJAY Hood MD Relationship    ER Physician Phone Number    [phone] " "Releases           \"Sonja Joe is a 37 year old female with a history of substance abuse, depression, anxiety who presents to the Emergency Department today for admission to detox from heroin.  The patient reports that she called ahead and they do have a bed on hold for her.  The patient reports that she has been using about 0.5 g of heroin a day ($40-$100 worth).  Her last use was this morning.  The patient reports that she has been using heroin since last December, about 3 months.  She states that she typically snorts it, however, 2 days ago she used IV for the first time and states that at that moment she realized that she needed help.  She does report that she has a history of opiate abuse but had been sober for 2.5 years prior to December.  She reports that she had a roommate who also uses heroin so it gave her easy access and she knew where to get it from.  She does note that she has since moved out, and now lives with her parents.  She does also note that she snorted meth once a few weeks ago.  She denies the abuse of any prescription drugs.  Patient also denies use of alcohol.  Patient denies any suicidal ideation.  The patient reports that she has received treatment in the past at [facility]. She notes that her typical withdrawals or chills, vomiting, diarrhea, and anxiety.  She denies experiencing any of those withdrawal symptoms currently.  Patient's last menstrual period was 3 weeks ago.\"    Collateral Contacts     Name    [facility]'s EMR   Relationship     Phone Number     Releases           Information Provided:  This writer reviewed this patients Electronic Medical Record and the information reviewed largely supports the information the patient reported during their CD evaluation.    llateral Contacts      A problematic pattern of alcohol/drug use leading to clinically significant impairment or distress, as manifested by at least two of the following, occurring within a 12-month " period:    Alcohol/drug is often taken in larger amounts or over a longer period than was intended.  There is a persistent desire or unsuccessful efforts to cut down or control alcohol/drug use  A great deal of time is spent in activities necessary to obtain alcohol, use alcohol, or recover from its effects.  Craving, or a strong desire or urge to use alcohol/drug  Recurrent alcohol/drug use resulting in a failure to fulfill major role obligations at work, school or home.  Continued alcohol use despite having persistent or recurrent social or interpersonal problems caused or exacerbated by the effects of alcohol/drug.  Important social, occupational, or recreational activities are given up or reduced because of alcohol/drug use.  Recurrent alcohol/drug use in situations in which it is physically hazardous.  Alcohol/drug use is continued despite knowledge of having a persistent or recurrent physical or psychological problem that is likely to have been caused or exacerbated by alcohol.  Tolerance, as defined by either of the following: A need for markedly increased amounts of alcohol/drug to achieve intoxication or desired effect. and A markedly diminished effect with continued use of the same amount of alcohol/drug.  Withdrawal, as manifested by either of the following: The characteristic withdrawal syndrome for alcohol/drug (refer to Criteria A and B of the criteria set for alcohol/drug withdrawal). and Alcohol/drug (or a closely related substance, such as a benzodiazepine) is taken to relieve or avoid withdrawal symptoms.      Specify if: In early remission:  After full criteria for alcohol/drug use disorder were previously met, none of the criteria for alcohol/drug use disorder have been met for at least 3 months but for less than 12 months (with the exception that Criterion A4,  Craving or a strong desire or urge to use alcohol/drug  may be met).     In sustained remission:   After full criteria for alcohol use  disorder were previously met, non of the criteria for alcohol/drug use disorder have been met at any time during a period of 12 months or longer (with the exception that Criterion A4,  Craving or strong desire or urge to use alcohol/drug  may be met).   Specify if:   This additional specifier is used if the individual is in an environment where access to alcohol is restricted.    Mild: Presence of 2-3 symptoms    Moderate: Presence of 4-5 symptoms    Severe: Presence of 6 or more symptoms

## 2018-03-12 NOTE — PLAN OF CARE
"Problem: Substance Withdrawal  Goal: Substance Withdrawal  Signs and symptoms of listed problems will be absent or manageable. 1. Detoxification from opiates using buprenorphine   2. Pt will complete assessment paperwork  3. Pt will meet with  for evaluation and discharge planning  4. Pt oral intake will be greater than 75% of meals to meet estimated needs  5. Pt will be provided opioid prevention resources  6. Physical examination by MD and Lab evaluation      Outcome: Improving    Patient up and visible in the milieu. Affect full range, mood is anxious. Pt rates current anxiety a 4/10 r/t discharge disposition planning. Pt denies SI/SIB/HI. Pt denies auditory/visual hallucinations. Pt is social on the unit, attending unit programming. Pt is quite pleasant and cooperative. COWS score upon first am assessment = 1. Patient reports stomach cramps at times, but reports these are mild. Pt reports fair appetite and sleep. Discharge plans pending.     Blood pressure 131/83, pulse 74, temperature 98.3  F (36.8  C), temperature source Oral, resp. rate 16, height 1.753 m (5' 9\"), weight 135.2 kg (298 lb), last menstrual period 02/15/2018, SpO2 93 %.          "
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
## 2018-03-12 NOTE — PROGRESS NOTES
"[facility], [place]   Psychiatric Progress Note    Interim history   This is a 37 year old female with 1.  Opiate use, severe.   2.  Major depressive disorder, recurrent, without psychotic features. .Pt seen in rounds. Patient's mood is fluctuates, get blue priscila thinking of stressors  Energy Level:HIGH  Sleep:No concerns, sleeps well through night  Appetite:normal motivation interest improving    deneid any Suicidal/homicidal ideation/plan intent.  Denied any psychosis  No prior suicde attempts  No access to gun  Pt is in detox  Pt cows score are monitered  Tolerating meds and has no side effects.              Medications:     Current Facility-Administered Medications   Medication     FLUoxetine (PROzac) tablet 20 mg     buprenorphine (SUBUTEX) sublingual tablet 2 mg     loperamide (IMODIUM) capsule 2 mg     simethicone (MYLICON) chewable tablet 80 mg     nicotine polacrilex (NICORETTE) gum 2-4 mg     hydrOXYzine (ATARAX) tablet 25 mg     acetaminophen (TYLENOL) tablet 650 mg     alum & mag hydroxide-simethicone (MYLANTA ES/MAALOX  ES) suspension 30 mL     magnesium hydroxide (MILK OF MAGNESIA) suspension 30 mL     bisacodyl (DULCOLAX) Suppository 10 mg     traZODone (DESYREL) tablet 50 mg     ondansetron (ZOFRAN-ODT) ODT tab 4 mg     ibuprofen (ADVIL/MOTRIN) tablet 600 mg     naloxone (NARCAN) injection 0.1-0.4 mg             Allergies:     Allergies   Allergen Reactions     No Known Drug Allergies             Psychiatric Examination:   Blood pressure 131/83, pulse 74, temperature 98.3  F (36.8  C), temperature source Oral, resp. rate 16, height 1.753 m (5' 9\"), weight 135.2 kg (298 lb), last menstrual period 02/15/2018, SpO2 93 %.  Weight is 298 lbs 0 oz  Body mass index is 44.01 kg/(m^2).    Appearance:  awake, alert and adequately groomed  Attitude:  cooperative  Eye Contact:  good  Mood:  better  Affect:  appropriate and in normal range and mood congruent  Speech:  clear, coherent " "rate /rhythm are good  Psychomotor Behavior:  no evidence of tardive dyskinesia, dystonia, or tics and intact station, gait and muscle tone  Throught Process:  logical  Associations:  no loose associations  Thought Content:  no evidence of suicidal ideation or homicidal ideation, no evidence of psychotic thought, no auditory hallucinations present and no visual hallucinations present  Insight:  good  Judgement:  intact  Oriented to:  time, person, and place  Attention Span and Concentration:  intact  Recent and Remote Memory:  intact  Language fund of knowledge are adequate         Labs:     No results found for: NTBNPI, NTBNP  Lab Results   Component Value Date    WBC 9.0 03/09/2018    HGB 12.6 03/09/2018    HCT 40.8 03/09/2018    MCV 81 03/09/2018     03/09/2018     Lab Results   Component Value Date    TSH 1.13 03/09/2018            1.  Opiate use, severe.   2.  Major depressive disorder, recurrent, without psychotic features.   Plan  Will continue subutex 2mg sl qid  Will increase prozac 30 mg po qd   Laboratory/Imaging: reviewed with patient   Consults: internal medicine consult reviewed  Patient will be treated in therapeutic milieu with appropriate individual and group therapies as described.      Medical diagnoses to be addressed this admission:    \"# Abdominal pain  # Diarrhea  Likely d/t withdrawals.  No recent abx use.  No fevers.   - Imodium, simethicone PRN   - Please contact medicine if symptoms persist or if accompanied by fevers            Legal Status: voluntary    Safety Assessment:   Checks:  15 min  Precautions: withdrawal precautions  Pt has not required locked seclusion or restraints in the past 24 hours to maintain safety, please refer to RN documentation for further details.  Discussed with patient many issues of addiction,triggers, relapse, and establishing a solid recovery program.  Able to give informed consent:  YES   Discussed Risks/Benefits/Side Effects/Alternatives: YES    After " discussion of the indications, risks, benefits, alternatives and consequences of no treatment, the patient elects to complete detox and do trt.

## 2018-03-13 ENCOUNTER — HOSPITAL ENCOUNTER (OUTPATIENT)
Dept: BEHAVIORAL HEALTH | Facility: CLINIC | Age: 38
End: 2018-03-13
Attending: FAMILY MEDICINE
Payer: COMMERCIAL

## 2018-03-13 VITALS
TEMPERATURE: 97.1 F | OXYGEN SATURATION: 93 % | RESPIRATION RATE: 16 BRPM | WEIGHT: 293 LBS | HEART RATE: 72 BPM | HEIGHT: 69 IN | DIASTOLIC BLOOD PRESSURE: 75 MMHG | BODY MASS INDEX: 43.4 KG/M2 | SYSTOLIC BLOOD PRESSURE: 124 MMHG

## 2018-03-13 PROBLEM — F19.20 CHEMICAL DEPENDENCY (H): Status: ACTIVE | Noted: 2018-03-13

## 2018-03-13 PROCEDURE — 99239 HOSP IP/OBS DSCHRG MGMT >30: CPT | Performed by: PSYCHIATRY & NEUROLOGY

## 2018-03-13 PROCEDURE — 10020000 ZZH LODGING PLUS FACILITY CHARGE ADULT

## 2018-03-13 PROCEDURE — H2035 A/D TX PROGRAM, PER HOUR: HCPCS | Mod: HQ

## 2018-03-13 PROCEDURE — 25000132 ZZH RX MED GY IP 250 OP 250 PS 637: Performed by: PSYCHIATRY & NEUROLOGY

## 2018-03-13 RX ORDER — LORATADINE 10 MG/1
10 TABLET ORAL DAILY PRN
COMMUNITY
End: 2018-04-07

## 2018-03-13 RX ORDER — AMOXICILLIN 250 MG
2 CAPSULE ORAL DAILY PRN
COMMUNITY
End: 2018-04-07

## 2018-03-13 RX ORDER — MAGNESIUM HYDROXIDE/ALUMINUM HYDROXICE/SIMETHICONE 120; 1200; 1200 MG/30ML; MG/30ML; MG/30ML
30 SUSPENSION ORAL EVERY 6 HOURS PRN
COMMUNITY
End: 2018-04-07

## 2018-03-13 RX ORDER — FLUOXETINE 10 MG/1
30 TABLET, FILM COATED ORAL DAILY
Qty: 90 TABLET | Refills: 0 | Status: SHIPPED | OUTPATIENT
Start: 2018-03-13

## 2018-03-13 RX ORDER — FLUOXETINE 10 MG/1
30 TABLET, FILM COATED ORAL DAILY
Status: DISCONTINUED | OUTPATIENT
Start: 2018-03-14 | End: 2018-03-13

## 2018-03-13 RX ORDER — TRAZODONE HYDROCHLORIDE 50 MG/1
50 TABLET, FILM COATED ORAL
Qty: 30 TABLET | Refills: 0 | Status: SHIPPED | OUTPATIENT
Start: 2018-03-13

## 2018-03-13 RX ORDER — FLUOXETINE 10 MG/1
30 TABLET, FILM COATED ORAL DAILY
Status: DISCONTINUED | OUTPATIENT
Start: 2018-03-13 | End: 2018-03-13 | Stop reason: HOSPADM

## 2018-03-13 RX ADMIN — BUPRENORPHINE HCL 2 MG: 2 TABLET SUBLINGUAL at 09:11

## 2018-03-13 RX ADMIN — BUPRENORPHINE HCL 2 MG: 2 TABLET SUBLINGUAL at 13:07

## 2018-03-13 RX ADMIN — FLUOXETINE 30 MG: 10 TABLET, FILM COATED ORAL at 09:10

## 2018-03-13 ASSESSMENT — ACTIVITIES OF DAILY LIVING (ADL)
ORAL_HYGIENE: INDEPENDENT
GROOMING: INDEPENDENT
DRESS: INDEPENDENT

## 2018-03-13 NOTE — PROGRESS NOTES
Name: Sonja Joe  Date: 3/13/2018  Medical Record: [id number]    Envelope Number: [id number]    List of Contents (List each item separately in new row):   One Cell Phone (iPhone)     Admission:  I am responsible for any personal items that are not sent to the safe or pharmacy.  [facility] is not responsible for loss, theft or damage of any property in my possession.      Patient Signature:  ___________________________________________       Date/Time:__________________________    Staff Signature: __________________________________       Date/Time:__________________________    2nd Staff person, if patient is unable/unwilling to sign:      __________________________________________________________       Date/Time: __________________________      Discharge:  [facility] has returned all of my personal belongings:    Patient Signature: ________________________________________     Date/Time: ____________________________________    Staff Signature: ______________________________________     Date/Time:_____________________________________

## 2018-03-13 NOTE — PROGRESS NOTES
Patient discharging to [facility]. Discharge AVS reviewed, as well as discharge medications. They have no further questions and are aware to call the unit any time after discharge should further questions arise.

## 2018-03-13 NOTE — PROGRESS NOTES
"[facility], [place]   Psychiatric Progress Note    Interim history   This is a 37 year old female with 1.  Opiate use, severe.   2.  Major depressive disorder, recurrent, without psychotic features. .Pt seen in rounds. Patient's mood is ok   Energy Level:HIGH  Sleep:No concerns, sleeps well through night  Appetite:normal motivation interest improving    deneid any Suicidal/homicidal ideation/plan intent.  Denied any psychosis  No prior suicde attempts  No access to gun  Pt is in detox  Pt cows score are monitered  Tolerating meds and has no side effects.              Medications:     Current Facility-Administered Medications   Medication     FLUoxetine (PROzac) tablet 20 mg     buprenorphine (SUBUTEX) sublingual tablet 2 mg     loperamide (IMODIUM) capsule 2 mg     simethicone (MYLICON) chewable tablet 80 mg     nicotine polacrilex (NICORETTE) gum 2-4 mg     hydrOXYzine (ATARAX) tablet 25 mg     acetaminophen (TYLENOL) tablet 650 mg     alum & mag hydroxide-simethicone (MYLANTA ES/MAALOX  ES) suspension 30 mL     magnesium hydroxide (MILK OF MAGNESIA) suspension 30 mL     bisacodyl (DULCOLAX) Suppository 10 mg     traZODone (DESYREL) tablet 50 mg     ondansetron (ZOFRAN-ODT) ODT tab 4 mg     ibuprofen (ADVIL/MOTRIN) tablet 600 mg     naloxone (NARCAN) injection 0.1-0.4 mg             Allergies:     Allergies   Allergen Reactions     No Known Drug Allergies             Psychiatric Examination:   Blood pressure 124/75, pulse 72, temperature 97.1  F (36.2  C), temperature source Oral, resp. rate 16, height 1.753 m (5' 9\"), weight 135.2 kg (298 lb), last menstrual period 02/15/2018, SpO2 93 %.  Weight is 298 lbs 0 oz  Body mass index is 44.01 kg/(m^2).    Appearance:  awake, alert and adequately groomed  Attitude:  cooperative  Eye Contact:  good  Mood:  better  Affect:  appropriate and in normal range and mood congruent  Speech:  clear, coherent rate /rhythm are good  Psychomotor Behavior:  " "no evidence of tardive dyskinesia, dystonia, or tics and intact station, gait and muscle tone  Throught Process:  logical  Associations:  no loose associations  Thought Content:  no evidence of suicidal ideation or homicidal ideation, no evidence of psychotic thought, no auditory hallucinations present and no visual hallucinations present  Insight:  good  Judgement:  intact  Oriented to:  time, person, and place  Attention Span and Concentration:  intact  Recent and Remote Memory:  intact  Language fund of knowledge are adequate         Labs:     No results found for: NTBNPI, NTBNP  Lab Results   Component Value Date    WBC 9.0 03/09/2018    HGB 12.6 03/09/2018    HCT 40.8 03/09/2018    MCV 81 03/09/2018     03/09/2018     Lab Results   Component Value Date    TSH 1.13 03/09/2018            1.  Opiate use, severe.   2.  Major depressive disorder, recurrent, without psychotic features.   Plan  Will continue subutex 2mg sl qid  Will increase prozac 30 mg po qd   Laboratory/Imaging: reviewed with patient   Consults: internal medicine consult reviewed  Patient will be treated in therapeutic milieu with appropriate individual and group therapies as described.      Medical diagnoses to be addressed this admission:    \"# Abdominal pain  # Diarrhea  Likely d/t withdrawals.  No recent abx use.  No fevers.   - Imodium, simethicone PRN   - Please contact medicine if symptoms persist or if accompanied by fevers            Legal Status: voluntary    Safety Assessment:   Checks:  15 min  Precautions: withdrawal precautions  Pt has not required locked seclusion or restraints in the past 24 hours to maintain safety, please refer to RN documentation for further details.  Discussed with patient many issues of addiction,triggers, relapse, and establishing a solid recovery program.  Able to give informed consent:  YES   Discussed Risks/Benefits/Side Effects/Alternatives: YES    After discussion of the indications, risks, " benefits, alternatives and consequences of no treatment, the patient elects to complete detox and do trt.

## 2018-03-13 NOTE — PROGRESS NOTES
Diet & BMI Assessment     Are you on a special diet?    No   Do you have any concerns regarding your nutritional status?   No   Have you had any appetite changes in the last 3 months?       No   Have you had any weight loss or weight gain in the last 3 months?    If yes, how much weight gain or loss:    If weight patient gains or loses is more than 10 pounds, refer to primary care provider for assessment.        No   Was the patient informed of BMI?    Above,  General nutrition education   No

## 2018-03-13 NOTE — PROGRESS NOTES
Per 3A RN, 7 day supply of suboxone ordered at discharge, suboxone maintenance appt/provider has not been set up.  If  appt for suboxone maintenance is greater than 7 days out from day of admission 3/18, please request suboxone bridge from Dr. Cheng

## 2018-03-13 NOTE — PROGRESS NOTES
3/13/18  Pt transferred from [facility] detox to [facility], this date.  She joined the afternoon group, introduced herself to peers and became active in the group process.  Pt met with counselors and was provided basic materials to include a treatment plan goal sheet and a Patient Safety Plan Template.  Pt appears willing for treatment at this time.  Pt to attend [facility] orientation and continue program.  Pt to meet with counselor for discussion and development of treatment plan.

## 2018-03-13 NOTE — PROGRESS NOTES
[facility] Nursing Health Assessment      Vital signs: Per 3a    LMP 02/15/2018 (Approximate)      Transfer from 3a    Counselor: Mary  Drug of Choice: Heroin  Last use: 3/8/2018  Home clinic/MD: none  Psychiatrist/therapist: Lesley Carnes, hasn't seen for approx 6 months    Medical history/current conditions:  none    H&P Screen:  H&P within the last 90 days: Yes.  Date: March 2018 Location:       Mental Health diagnosis: Depression/anxiety  Medication compliant?: yes  Recent sucidal thoughts? no     When? n/a  Current thought of self-harm? no    Plan? n/a    Pain assessment:   Pt. Experiencing pain at this time?  No      Nursing Assessment Summary:  Client has not set up suboxone maintenance provider prior to transfer.  Has 7 days of suboxone on day of admit.  CLient reports she has contact numbers to call to set up appt.    On-going nursing intervention required?   No    Acute care visit recommended: no

## 2018-03-13 NOTE — PROGRESS NOTES
Case Management Note  3/13/2018    Writer informed there is 1 female bed in [facility] that is available today, Tuesday, 3/13/18 for admission. Pt is the next person scheduled for admission. Pt scheduled to admit to [facility]. Writer completed SBAR and routed to [facility]. Writer faxed pt's Rule 25 assessment to [facility] for authorization for [facility].    Writer received a phone call from Adria at [facility]. She approved pt for [facility]. Case management complete.    Baljit Knight MA, [facility]
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
## 2018-03-13 NOTE — DISCHARGE INSTRUCTIONS
Behavioral Discharge Planning and Instructions  THANK YOU FOR CHOOSING THE [place] [address] A [facility]    Summary: You were admitted to Station 3A on 3/8/18 for detoxification from opiates.  A medical exam was performed that included lab work. You have met with a  and been approved for admission to [facility].  Please take care and make your recovery a priority Sonja! It was a pleasure working with you and the treatment team wishes you the very best in your recovery!  ~Baljit    Recommendation:  Residential Treatment, psychotherapy, sober support group engagement and active work with a sponsor or  through [facility].    Main Diagnoses: per Dr. Cheng psychiatrist.    DIAGNOSIS:  AXIS I:     1.  Opiate use, severe.   2.  Major depressive disorder, recurrent, without psychotic features.     Major Treatments, Procedures and Findings: Your opiate withdrawal was treated with buprenorphine. You are planning to continue on with a suboxone maintenance provider.    Please keep your suboxone in a safe place preferably in a locked box and out of reach of children/pets/others.     You were followed by Psychiatry and Medical. You met with Case Management to complete a chemical health assessment and for discharge planning. You were given a copy of your lab results which were reviewed with you. Please bring your lab results with you to your primary follow up appointment. Make your recovery a priority, Sonja.    Symptoms to Report: If  you experience feeling more anxiety, confusion, losing more sleep, mood declining or thoughts of suicide, notify your treatment team or notify your primary physician. IF ANY OF THE SYMPTOMS YOU ARE EXPERIENCING ARE A MEDICAL EMERGENCY CALL 911 IMMEDIATELY.    Lifestyle Adjustment: Adjust your lifestyle to get adequate sleep, relaxation, exercise and  good nutrition. Continue to develop healthy coping skills to  decrease stress and promote a sober  living environment. No use of alcohol, illegal drugs or addictive medications other than what is currently prescribed. AA, NA, and  Sponsor are excellent resources for support.    There has been an increase in opioid overdoses and deaths recently . After even a short period of no drug use a persons  tolerance level to using drugs is decreased.   It is not safe to think that you can use the same amount of drugs as you did prior to coming into the hospital. Also the mixing of alcohol and/or multiple drugs increases the risk of overdose.     Returning to your drug using environment and contact with your drug using friends puts you at high risk for certain relapse.     Keeping hands clean is one of the most important steps we can take to avoid getting sick and spreading germs to others.  Please wash your hands frequently.     Medical Provider Follow Up: you are currently in the process of obtaining a suboxone maintaince dr and are awaiting calls back. [facility]    Please take this discharge folder with you to all your follow up appointments as it contains your lab results, diagnosis, medication list and discharge recommendations.     Support Group:  AA/CHRISTOPHER and Sponsor contact/support    Resources:  Crisis Intervention: 108.579.8120 or 882-679-0756 (TTY: 416.679.4446).  Call anytime for help.  Suicide Awareness Voices of Education (SAVE) (www.save.org): 607-816-EBNV (5155)  National Suicide Prevention Line (www.mentalhealthmn.org): 000-521-DWES (7050)  National [place] on Mental Illness (www.mn.will.org): 766.416.9973 or 640-455-2470.  Alcoholics Anonymous (www.alcoholics-anonymous.org): Or local information can be found 24 hours a day at:   AA Intergroup service office in [place] (http://www.aastpa[place].org/) 917.317.2520  AA Intergroup service office in [facility]: [phone]. (http://www.[place].org/)  Narcotics Anonymous  "(www.[facility].org)2-421-004-1871   [facility] Behavioral Intake 520-018-0743    CSQ6Cnlr is a suicide prevention resource for residents in [place].  We can help you with relationship issues, general mental health, and suicide.  We are free, confidential, and here for you 24/7/365.  1. Text \"Life\" to 03926  2. Wait for a trained crisis counselor to respond to your text  3. Respond and start the conversation about what you are concerned about   If you believe a person s life is in imminent danger, call 911.    [facility] ([facility])  [facility] connects people seeking recovery to resources that help foster and sustain long-term recovery.  Whether you are seeking resources for treatment, transportation, housing, job training, education, health care or other pathways to recovery, [facility] is a great place to start.  906.852.4133. Www.[facility].org    General Medication Instructions:   See your medication sheet(s) for instructions. Take all medicines as directed.  Make no changes unless your doctor directs them. Go to all your doctor visits. Be sure to have all your required lab tests. This way, your medicines can be refilled in timely fashion. Do not use any drugs not prescribed by your provider. AA/NA and Sponsors are excellent resources for support. Avoid alcohol.    Please Note: If you have any questions at anytime after you are discharged please call the [facility] detox unit 3AW unit at [phone].  [facility], Behavioral Intake [phone]  Please take this discharge folder with you to all your follow up appointments, it contains your lab results, diagnosis, medication list and discharge recommendations.      Medical Records [phone]      THANK YOU FOR CHOOSING THE [facility]                               "

## 2018-03-13 NOTE — DISCHARGE SUMMARY
Admit Date:     03/08/2018   Discharge Date:  3/13 /18         More than 35 minutes spent on this summary, doing the discharge instructions, discharge medications discharge mental status examination.        DIAGNOSES:   Axis I:     Opiate use, severe.   Major depressive disorder, recurrent, without psychotic features.      IDENTIFYING INFORMATION:  The patient is a 37-year-old  female admitted for detoxification.  Please see detailed admission note by Dr. Cheng on 03/09/2018.      HOSPITAL COURSE:  During the hospitalization, the patient was started on Prozac.  It was increased to 30 mg.  During the hospitalization, the patient's energy, motivation, sleep and interest improved.  She had an uneventful upward titration of her Suboxone.  Her energy, motivation, sleep and interest improved.  She did not have any suicidal or homicidal ideation, plan or intent.  She had lab work done which shows comprehensive metabolic panel normal; MCH is 24.9, MCHC is 30.9.  She had hepatitis C and HIV nonreactive.  During the hospitalization, the patient saw Internal Medicine in consult.  Please review the detailed note by Pricila Morrison on 03/09/2018.  During the hospitalization, the patient had an uneventful upward titration.  Her energy, motivation, sleep and interest improved.  She met with [facility].  Her plan is to do treatment at [facility].      DISCHARGE DISPOSITION:  The patient is going to [facility].      DISCHARGE MENTAL STATUS EXAMINATION:  The patient is alert, oriented x 3.  Recent and remote memory, language, fund of knowledge is all adequate.  No loose associations.  The patient does not have any suicidal or homicidal ideation, plan or intent.  She has an appointment with [facility] on 03/16/2018 for Suboxone maintenance.  Patient is going to [facility].      DISCHARGE MEDICATIONS:  Prozac 30 mg.  She is also on Suboxone 8 mg, nicotine gum.        The patient does not have any suicidal or  homicidal ideation, plan or intent.  The patient is stable to be discharged.               DISCHARGE MENTAL STATUS EXAMINATION:  The patient is alert, oriented x3.  Good fund of knowledge.  Good use of language.  Recent and remote memory, language, fund of knowledge are all adequate.  Euthymic mood congruent affect  Speech normal rate/rhythm linear tp no loose asso,The patient does not have any active suicidal or homicidal ideation.  Does not have any auditory or visual hallucination.  Fair insight/judgment At this time, the patient was stable to be discharged.         Educated about side effects/risk vs benefits /alternative including non treatment.Pt consented to be on medication.     .Total time spent on discharge summary more than 35 min  More than  20 min  planning, coordination of care, medication reconciliation and performance of physical exam on day of discharge.Care was coordinated with unit RN and unit therapist       Carlita Joe   Home Medication Instructions BEENA:[id number]    Printed on:18 1211   Medication Information                      buprenorphine HCl-naloxone HCl (SUBOXONE) 8-2 MG per film  Place 1 Film under the tongue daily             FLUoxetine (PROZAC) 10 MG tablet  Take 3 tablets (30 mg) by mouth daily             nicotine polacrilex (NICORETTE) 2 MG gum  Place 1-2 each (2-4 mg) inside cheek every hour as needed for smoking cessation             traZODone (DESYREL) 50 MG tablet  Take 1 tablet (50 mg) by mouth nightly as needed for sleep               JAISON CHAUDHARI MD             D: 2018   T: 2018   MT: GAYATHRI      Name:     JOECARLITA   MRN:      [id number]        Account:        [id number]   :      1980           Admit Date:     2018                                  Discharge Date:       Document: [id number]

## 2018-03-14 ENCOUNTER — HOSPITAL ENCOUNTER (OUTPATIENT)
Dept: BEHAVIORAL HEALTH | Facility: CLINIC | Age: 38
End: 2018-03-14
Attending: FAMILY MEDICINE
Payer: COMMERCIAL

## 2018-03-14 PROCEDURE — 10020000 ZZH LODGING PLUS FACILITY CHARGE ADULT

## 2018-03-14 PROCEDURE — H2035 A/D TX PROGRAM, PER HOUR: HCPCS | Mod: HQ

## 2018-03-15 ENCOUNTER — HOSPITAL ENCOUNTER (OUTPATIENT)
Dept: BEHAVIORAL HEALTH | Facility: CLINIC | Age: 38
End: 2018-03-15
Attending: FAMILY MEDICINE
Payer: COMMERCIAL

## 2018-03-15 PROCEDURE — H2035 A/D TX PROGRAM, PER HOUR: HCPCS | Mod: HQ

## 2018-03-15 PROCEDURE — 10020000 ZZH LODGING PLUS FACILITY CHARGE ADULT
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
## 2018-03-16 ENCOUNTER — HOSPITAL ENCOUNTER (OUTPATIENT)
Dept: BEHAVIORAL HEALTH | Facility: CLINIC | Age: 38
End: 2018-03-16
Attending: FAMILY MEDICINE
Payer: COMMERCIAL

## 2018-03-16 PROCEDURE — 10020000 ZZH LODGING PLUS FACILITY CHARGE ADULT

## 2018-03-16 PROCEDURE — H2035 A/D TX PROGRAM, PER HOUR: HCPCS | Mod: HQ

## 2018-03-16 NOTE — PROGRESS NOTES
3/15/18  Patient presented her Book of Me in group therapy today. Patient identified her negative self-talk and struggles with managing stress and anxiety. Patient verbalized she has high expectations for herself and tends to be a perfectionist. Patient was encouraged to continue identifying positive self-talk statements and was praised for her honesty and willingness. Patient to continue working on treatment plan goals.     OPHELIA Garcia

## 2018-03-17 ENCOUNTER — HOSPITAL ENCOUNTER (OUTPATIENT)
Dept: BEHAVIORAL HEALTH | Facility: CLINIC | Age: 38
End: 2018-03-17
Attending: FAMILY MEDICINE
Payer: COMMERCIAL

## 2018-03-17 PROCEDURE — H2035 A/D TX PROGRAM, PER HOUR: HCPCS | Mod: HQ

## 2018-03-17 PROCEDURE — 10020000 ZZH LODGING PLUS FACILITY CHARGE ADULT

## 2018-03-18 ENCOUNTER — HOSPITAL ENCOUNTER (OUTPATIENT)
Dept: BEHAVIORAL HEALTH | Facility: CLINIC | Age: 38
End: 2018-03-18
Attending: FAMILY MEDICINE
Payer: COMMERCIAL

## 2018-03-19 ENCOUNTER — HOSPITAL ENCOUNTER (OUTPATIENT)
Dept: BEHAVIORAL HEALTH | Facility: CLINIC | Age: 38
End: 2018-03-19
Attending: FAMILY MEDICINE
Payer: COMMERCIAL

## 2018-03-19 PROCEDURE — 10020000 ZZH LODGING PLUS FACILITY CHARGE ADULT

## 2018-03-19 PROCEDURE — H2035 A/D TX PROGRAM, PER HOUR: HCPCS | Mod: HQ

## 2018-03-20 ENCOUNTER — HOSPITAL ENCOUNTER (OUTPATIENT)
Dept: BEHAVIORAL HEALTH | Facility: CLINIC | Age: 38
End: 2018-03-20
Attending: FAMILY MEDICINE
Payer: COMMERCIAL

## 2018-03-20 PROCEDURE — 10020000 ZZH LODGING PLUS FACILITY CHARGE ADULT

## 2018-03-20 PROCEDURE — H2035 A/D TX PROGRAM, PER HOUR: HCPCS | Mod: HQ

## 2018-03-20 NOTE — PROGRESS NOTES
Patient:  Sonja Joe            Adult CD Progress Note and Treatment Plan Review     Attendance  Please refer to OP BEH CD Adult Attendance Record Documentation Flowsheet    Support group attended this week: yes    Reporting sobriety:  yes    Treatment Plan     Treatment Plan Review competed on: 3/20/18       Client preferred learning style: Visual  Hands on  Verbal  Demonstration    Staff Members contributing Maxi Alanis [facility]; OPHEILA Shah; [facility]x [facility]                     Received Supervision: No    Client: contributed to goals and plan.    Client received copy of plan/revised plan: Yes    Client agrees with plan/revised plan: Yes        Changes to Treatment Plan: No    New Goals added since last review None    Goals worked on since last review Book of Me, Consequences, spirituality, relapse prevention, coping skills, education on mental health and addiction    Strategies effective: yes    Strategies need these changes: None needed    1) Care Coordination Activities:  Pt to meet with  counseling staff   2) Medical, Mental Health and other appointments the client attended: See chart  3) Medication issues: Suboxone taper  4) Physical and mental health problems: Withdrawal symptoms related to suboxone taper  5) Any changes in Vulnerable Adult Status?  No If yes, add to treatment plan and individual abuse prevention plan.  6) Review and evaluation of the individual abuse prevention plan: Current IAPP for this program is adequate for this client        ASAM Risk Rating:    Dimension 1 1 Patient reports some withdrawal symptoms this week due to her suboxone taper.     Dimension 2 0 Patient denies any medical symptoms that would interfere with treatment. Patient is able to seek medical services as needed independently.     Dimension 3 2 Patient reports her mood has been up and down a lot this week. Patient denies any suicidal ideation or thoughts of self-harm at this time.     Dimension 4 2  "Patient completed and presented her \"Consequences\" assignment in group this week. Patient discussed how her substance use has violated her values related to parenting, honesty, and finances. Patient is actively participating in programming and contributing to therapy groups.     Dimension 5 4 Patient rates her cravings this week a \"1\" on a scale of 1-10(high). Patient reports some aggressive behavior from female peers has at times made her uncomfortable on the unit. Patient reports she has coped by talking with staff and reaching out to her sober support network.       Dimension 6 3 Patient has been attending 12-step support group meetings and getting to know female peers. Patient is unsure as to her aftercare plans at this time.       Guide to Risk Ratings for Suicidality:   IDEATION: Active thoughts of suicide? INTENT: Intent to follow on suicide? PLAN: Plan to follow through on suicide? Level of Risk:   IF Yes Yes Yes Patient = High Emergent   IF Yes Yes No Patient = High Urgent/Non-Emergent   IF Yes No No Patient = Moderate Non-Urgent   IF No No   No Patient = Low Risk   The patient's ADDITIONAL RISK FACTORS and lack of PROTECTIVE FACTORS may increase their overall suicide risk ratings.     Patient's/client's current risk rating:  Low Risk    Family Involvement:   none schedule this week    Data:   offered feedback good insight client did participate      Intervention:   Counselor feedback  Education  Emotional management  Group feedback  Motivational Enhancement Therapy  Relapse prevention  Twelve Step facilitation  Mental health education  spirituality    Assessment:   Stages of Change Model  Contemplation    Appears/Sounds:  Cooperative  Motivated  Engaged    Plan:  Focus on recovery environment  Monitor emotional/physical health      OPHELIA Garcia        "

## 2018-03-21 ENCOUNTER — HOSPITAL ENCOUNTER (OUTPATIENT)
Dept: BEHAVIORAL HEALTH | Facility: CLINIC | Age: 38
End: 2018-03-21
Attending: FAMILY MEDICINE
Payer: COMMERCIAL

## 2018-03-21 PROCEDURE — 10020000 ZZH LODGING PLUS FACILITY CHARGE ADULT

## 2018-03-21 PROCEDURE — H2035 A/D TX PROGRAM, PER HOUR: HCPCS | Mod: HQ

## 2018-03-21 PROCEDURE — H2035 A/D TX PROGRAM, PER HOUR: HCPCS

## 2018-03-21 NOTE — PROGRESS NOTES
Patient Safety Plan Template    Name:   Sonja Joe YOB: 1980 Age:  37 year old MR Number:  [id number]   Step 1: Warning signs (Thoughts, images, mood, situation, behavior) that a crisis may be developin.  Isolate   2.   Negativity   3.   Irritability   Step 2: Internal coping strategies - Things I can do to take my mind off of my problems without contacting another person (relaxation technique, physical activity):     1.  breathe   2.   walk   3.   swim   Step 3: People and social settings that provide distraction:     1. Name:   Sujey sponsor Phone:  In cell phone   2. Name:  Sara sober friend Phone:   In cell phone   3. Place:   [facility] 4. Place:   [facility]      The one thing that is most important to me and worth living for is: myself and my son     Step 4: People whom I can ask for help:     1. Name:  Cinthia Phone:   In cell phone   2. Name:   Sara Phone:   In cell phone   3. Name:  Carlos Eduardo Hamilton Phone:  In cell phone   Step 5: Professionals or agencies I can contact during a crisis:     1. Clinician Name:   Phone:  N/A   Clinician Pager or Emergency Contact #:   N/A   2. Clinician Name:   Not provided Phone:   N/A   Clinician Pager or Emergency Contact #:   N/A   3. Local Urgent Care Services:[facility]    Urgent Care Services Address: Not provided    Urgent Care Services Phone: N/A     4. Suicide Prevention Lifeline Phone: [phone]-NBCG ([phone])     Step 6: Making the environment safe:     1.   Not being in the environment    2.   I don't know.   Safety Plan Template 2008 Carri Jackson and Cricket Carvajal is reprinted with the express permission of the authors.  No portion of the Safety Plan Template may be reproduced without the express, written permission.  You can contact the authors at [email or web address] or [email or web address].
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
## 2018-03-22 ENCOUNTER — HOSPITAL ENCOUNTER (OUTPATIENT)
Dept: BEHAVIORAL HEALTH | Facility: CLINIC | Age: 38
End: 2018-03-22
Attending: FAMILY MEDICINE
Payer: COMMERCIAL

## 2018-03-22 PROCEDURE — H2035 A/D TX PROGRAM, PER HOUR: HCPCS | Mod: HQ

## 2018-03-22 PROCEDURE — 10020000 ZZH LODGING PLUS FACILITY CHARGE ADULT

## 2018-03-22 NOTE — PROGRESS NOTES
Name: Sonja Joe  Date: 3/21/2018  Medical Record: [id number]    Envelope Number: [id number]    List of Contents (List each item separately in new row):   One bottle of Fluoxetine HCL 10 MG Tabs    Admission:  I am responsible for any personal items that are not sent to the safe or pharmacy.  [facility] is not responsible for loss, theft or damage of any property in my possession.      Patient Signature:  ___________________________________________       Date/Time:__________________________    Staff Signature: __________________________________       Date/Time:__________________________    2nd Staff person, if patient is unable/unwilling to sign:      __________________________________________________________       Date/Time: __________________________      Discharge:  [facility] has returned all of my personal belongings:    Patient Signature: ________________________________________     Date/Time: ____________________________________    Staff Signature: ______________________________________     Date/Time:_____________________________________

## 2018-03-22 NOTE — PROGRESS NOTES
Name: Sonja Joe  Date: 3/21/2018  Medical Record: [id number]    Envelope Number: [id number]    List of Contents (List each item separately in new row):   One Half Suboxone 8 MG sent down to security for destruction  Admission:  I am responsible for any personal items that are not sent to the safe or pharmacy.  [facility] is not responsible for loss, theft or damage of any property in my possession.      Patient Signature:  ___________________________________________       Date/Time:__________________________    Staff Signature: __________________________________       Date/Time:__________________________    2nd Staff person, if patient is unable/unwilling to sign:      __________________________________________________________       Date/Time: __________________________      Discharge:  [facility] has returned all of my personal belongings:    Patient Signature: ________________________________________     Date/Time: ____________________________________    Staff Signature: ______________________________________     Date/Time:_____________________________________

## 2018-03-23 ENCOUNTER — HOSPITAL ENCOUNTER (OUTPATIENT)
Dept: BEHAVIORAL HEALTH | Facility: CLINIC | Age: 38
End: 2018-03-23
Attending: FAMILY MEDICINE
Payer: COMMERCIAL

## 2018-03-23 PROCEDURE — H2035 A/D TX PROGRAM, PER HOUR: HCPCS | Mod: HQ

## 2018-03-23 PROCEDURE — 10020000 ZZH LODGING PLUS FACILITY CHARGE ADULT

## 2018-03-24 ENCOUNTER — HOSPITAL ENCOUNTER (OUTPATIENT)
Dept: BEHAVIORAL HEALTH | Facility: CLINIC | Age: 38
End: 2018-03-24
Attending: FAMILY MEDICINE
Payer: COMMERCIAL

## 2018-03-24 PROCEDURE — 10020000 ZZH LODGING PLUS FACILITY CHARGE ADULT

## 2018-03-24 PROCEDURE — H2035 A/D TX PROGRAM, PER HOUR: HCPCS | Mod: HQ

## 2018-03-25 ENCOUNTER — HOSPITAL ENCOUNTER (OUTPATIENT)
Dept: BEHAVIORAL HEALTH | Facility: CLINIC | Age: 38
End: 2018-03-25
Attending: FAMILY MEDICINE
Payer: COMMERCIAL

## 2018-03-25 PROCEDURE — 10020000 ZZH LODGING PLUS FACILITY CHARGE ADULT

## 2018-03-25 PROCEDURE — H2035 A/D TX PROGRAM, PER HOUR: HCPCS | Mod: HQ

## 2018-03-26 ENCOUNTER — HOSPITAL ENCOUNTER (OUTPATIENT)
Dept: BEHAVIORAL HEALTH | Facility: CLINIC | Age: 38
End: 2018-03-26
Attending: FAMILY MEDICINE
Payer: COMMERCIAL

## 2018-03-26 PROCEDURE — H2035 A/D TX PROGRAM, PER HOUR: HCPCS | Mod: HQ

## 2018-03-26 PROCEDURE — 10020000 ZZH LODGING PLUS FACILITY CHARGE ADULT

## 2018-03-27 ENCOUNTER — HOSPITAL ENCOUNTER (OUTPATIENT)
Dept: BEHAVIORAL HEALTH | Facility: CLINIC | Age: 38
End: 2018-03-27
Attending: FAMILY MEDICINE
Payer: COMMERCIAL

## 2018-03-27 PROCEDURE — H2035 A/D TX PROGRAM, PER HOUR: HCPCS | Mod: HQ

## 2018-03-27 PROCEDURE — 10020000 ZZH LODGING PLUS FACILITY CHARGE ADULT

## 2018-03-27 NOTE — PROGRESS NOTES
Patient:  Sonja Joe                Adult CD Progress Note and Treatment Plan Review     Attendance  Please refer to OP BEH CD Adult Attendance Record Documentation Flowsheet    Support group attended this week: yes    Reporting sobriety:  Yes    Treatment Plan     Treatment Plan Review competed on: 3/27/2018      Client preferred learning style: Visual  Hands on  Demonstration    Staff Members contributing  Maxi Alanis [facility] and  Diana Ballard [facility], Yamilex Marie [facility]       Received Supervision: Yes    Client: contributed to goals and plan.    Client received copy of plan/revised plan: Yes    Client agrees with plan/revised plan: Yes    Changes to Treatment Plan: No    New Goals added since last review No    Goals worked on since last review continuing stabilization, medication management, relationships, grief/loss, 1.1 therapy, spirituality, aftercare planning, esteem building    Strategies effective: yes    Strategies need these changes: Continue to address goals.    1) Care Coordination Activities:  Pt and counselors discussed options for outpatient programming   2) Medical, Mental Health and other appointments the client attended: 1.1 therapy with Dora Hernandez  3) Medication issues: suboxone -seeking maintenance  4) Physical and mental health problems: see Dim 2 and 3  5) Any changes in Vulnerable Adult Status? no  6) Review and evaluation of the individual abuse prevention plan: no changes      Guide to Risk Ratings for Suicidality:   IDEATION: Active thoughts of suicide? INTENT: Intent to follow on suicide? PLAN: Plan to follow through on suicide? Level of Risk:   IF Yes Yes Yes Patient = High Emergent   IF Yes Yes No Patient = High Urgent/Non-Emergent   IF Yes No No Patient = Moderate Non-Urgent   IF No No   No Patient = Low Risk   The patient's ADDITIONAL RISK FACTORS and lack of PROTECTIVE FACTORS may increase their overall suicide risk ratings.     Patient's/client's current risk rating:  Low  risk      ASAM Risk Rating:    Dimension 1 Risk  1  Pt reports her last use date as 3/8/18.  Pt is taking suboxoone prescribed by Dr Sandro Cheng.  Pt is seeking suboxone maintenance and has been provided referrals.      Dimension 2 Risk 0  Pt denies any chronic medical issues that may impair her ability to participate in programming. Pt  is able to access medical care as needed.    Dimension 3 Risk 2  Pt denies significant mood changes this week.  She reports feeling less stress and anxiety.  She shared an assignment re-framing negative self talk statements to positive and identify some of her accomplishments, qualities and strengths. Pt  She is meeting with therapist Dora Hernandez while on the LP unit.   Pt participated in grief group, facilitated by KYA Barrow, [facility].  Pt denies any thoughts of suicide or self harm.      Dimension 4 Risk  1   She is participating in programming and remains respectful to peers and staff.  Pt appears to be gaining some internal motivation for ongoing sobriety.     Dimension 5 Risk  4  Pt rated her cravings at a two, on a scale of 1 to 10, ten being high. Pt identified coping skills to include speaking with peers, yoga, exercise, walking, reading and sleeping.   Pt plans to attend relapse prevention workshops over the upcoming weekend.  Pt participated in the spirituality group, facilitated by Leana Pinon  and [facility], was present during group. Pt verbalizes her willingness to enter an outpatient program following [facility] completion.     Dimension 6  Risk  3  Pt is spending time with female peers and attending 12 step meetings on the LP unit. Pt attended relationship workshops over the past weekend.  Pt invited concerned persons to participate in the family week program.     Any changes in Vulnerable Adult Status?  No  If yes, add to treatment plan and individual abuse prevention plan.    Family Involvement:   Inviting concerned  persons to participate    Data:   client did participate    Intervention:   Aftercare planning  Counselor feedback  Education  Emotional management  Group feedback  Relapse prevention  Client & counselor reviewed and signed ISP & assessment summary  Mental health education    Assessment:   Contemplation  Preparation/Determination    Appears/Sounds:  Cooperative  Motivated  Engaged    Plan:  Focus on recovery environment  Monitor emotional/physical health    OPHELIA Titus

## 2018-03-28 ENCOUNTER — HOSPITAL ENCOUNTER (OUTPATIENT)
Dept: BEHAVIORAL HEALTH | Facility: CLINIC | Age: 38
End: 2018-03-28
Attending: FAMILY MEDICINE
Payer: COMMERCIAL

## 2018-03-28 ENCOUNTER — TELEPHONE (OUTPATIENT)
Dept: BEHAVIORAL HEALTH | Facility: CLINIC | Age: 38
End: 2018-03-28

## 2018-03-28 PROCEDURE — H2035 A/D TX PROGRAM, PER HOUR: HCPCS | Mod: HQ

## 2018-03-28 PROCEDURE — 10020000 ZZH LODGING PLUS FACILITY CHARGE ADULT

## 2018-03-28 PROCEDURE — H2035 A/D TX PROGRAM, PER HOUR: HCPCS

## 2018-03-28 NOTE — TELEPHONE ENCOUNTER
A phone call was made as follow-up to the Family Program mailing for the week of 4/2/18. Message left or spoke in person to individuals on JIMENA.
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
## 2018-03-29 ENCOUNTER — HOSPITAL ENCOUNTER (OUTPATIENT)
Dept: BEHAVIORAL HEALTH | Facility: CLINIC | Age: 38
End: 2018-03-29
Attending: FAMILY MEDICINE
Payer: COMMERCIAL

## 2018-03-29 PROCEDURE — H2035 A/D TX PROGRAM, PER HOUR: HCPCS | Mod: HQ

## 2018-03-29 PROCEDURE — 10020000 ZZH LODGING PLUS FACILITY CHARGE ADULT

## 2018-03-30 ENCOUNTER — HOSPITAL ENCOUNTER (OUTPATIENT)
Dept: BEHAVIORAL HEALTH | Facility: CLINIC | Age: 38
End: 2018-03-30
Attending: FAMILY MEDICINE
Payer: COMMERCIAL

## 2018-03-30 PROCEDURE — 10020000 ZZH LODGING PLUS FACILITY CHARGE ADULT

## 2018-03-30 PROCEDURE — H2035 A/D TX PROGRAM, PER HOUR: HCPCS | Mod: HQ

## 2018-03-31 ENCOUNTER — HOSPITAL ENCOUNTER (OUTPATIENT)
Dept: BEHAVIORAL HEALTH | Facility: CLINIC | Age: 38
End: 2018-03-31
Attending: FAMILY MEDICINE
Payer: COMMERCIAL

## 2018-03-31 PROCEDURE — H2035 A/D TX PROGRAM, PER HOUR: HCPCS | Mod: HQ

## 2018-03-31 PROCEDURE — 10020000 ZZH LODGING PLUS FACILITY CHARGE ADULT

## 2018-04-01 ENCOUNTER — HOSPITAL ENCOUNTER (OUTPATIENT)
Dept: BEHAVIORAL HEALTH | Facility: CLINIC | Age: 38
End: 2018-04-01
Attending: FAMILY MEDICINE
Payer: COMMERCIAL

## 2018-04-01 PROCEDURE — 10020000 ZZH LODGING PLUS FACILITY CHARGE ADULT

## 2018-04-01 PROCEDURE — H2035 A/D TX PROGRAM, PER HOUR: HCPCS | Mod: HQ

## 2018-04-02 ENCOUNTER — HOSPITAL ENCOUNTER (OUTPATIENT)
Dept: BEHAVIORAL HEALTH | Facility: CLINIC | Age: 38
End: 2018-04-02
Attending: FAMILY MEDICINE
Payer: COMMERCIAL

## 2018-04-02 PROCEDURE — H2035 A/D TX PROGRAM, PER HOUR: HCPCS | Mod: HQ

## 2018-04-02 PROCEDURE — 10020000 ZZH LODGING PLUS FACILITY CHARGE ADULT

## 2018-04-02 NOTE — ADDENDUM NOTE
Encounter addended by: Maxi Alnais, [facility] on: 4/2/2018  3:27 PM<BR>     Actions taken: Sign clinical note
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
## 2018-04-02 NOTE — PROGRESS NOTES
4/2/18  Pt completed an assignment on self-defeating/self-sabotaging identifying thoughts, feelings and actions that may create roadblocks to success.  Self-defeating attitudes, beliefs and feelings identified included uniqueness, perfectionism, fear, need for validation, unworthiness, negativity, hopeless feeling and boredom.  Behaviors Pt identified include isolation, eating, poor sleeping habits, stopping exercise and being dishonest.  Pt has been encouraged to work toward setting attainable goals and development of internal success.

## 2018-04-03 ENCOUNTER — HOSPITAL ENCOUNTER (OUTPATIENT)
Dept: BEHAVIORAL HEALTH | Facility: CLINIC | Age: 38
End: 2018-04-03
Attending: FAMILY MEDICINE
Payer: COMMERCIAL

## 2018-04-03 LAB — BARBITURATES UR QL: NEGATIVE

## 2018-04-03 PROCEDURE — 80307 DRUG TEST PRSMV CHEM ANLYZR: CPT | Performed by: FAMILY MEDICINE

## 2018-04-03 PROCEDURE — H2035 A/D TX PROGRAM, PER HOUR: HCPCS | Mod: HQ

## 2018-04-03 PROCEDURE — 10020000 ZZH LODGING PLUS FACILITY CHARGE ADULT

## 2018-04-04 ENCOUNTER — HOSPITAL ENCOUNTER (OUTPATIENT)
Dept: BEHAVIORAL HEALTH | Facility: CLINIC | Age: 38
End: 2018-04-04
Attending: FAMILY MEDICINE
Payer: COMMERCIAL

## 2018-04-04 PROCEDURE — H2035 A/D TX PROGRAM, PER HOUR: HCPCS

## 2018-04-04 PROCEDURE — 10020000 ZZH LODGING PLUS FACILITY CHARGE ADULT

## 2018-04-04 PROCEDURE — H2035 A/D TX PROGRAM, PER HOUR: HCPCS | Mod: HQ
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
## 2018-04-05 ENCOUNTER — HOSPITAL ENCOUNTER (OUTPATIENT)
Dept: BEHAVIORAL HEALTH | Facility: CLINIC | Age: 38
End: 2018-04-05
Attending: FAMILY MEDICINE
Payer: COMMERCIAL

## 2018-04-05 PROCEDURE — 10020000 ZZH LODGING PLUS FACILITY CHARGE ADULT

## 2018-04-05 PROCEDURE — H2035 A/D TX PROGRAM, PER HOUR: HCPCS | Mod: HQ

## 2018-04-06 ENCOUNTER — HOSPITAL ENCOUNTER (OUTPATIENT)
Dept: BEHAVIORAL HEALTH | Facility: CLINIC | Age: 38
End: 2018-04-06
Attending: FAMILY MEDICINE
Payer: COMMERCIAL

## 2018-04-06 PROCEDURE — 10020000 ZZH LODGING PLUS FACILITY CHARGE ADULT

## 2018-04-06 PROCEDURE — H2035 A/D TX PROGRAM, PER HOUR: HCPCS | Mod: HQ

## 2018-04-07 ENCOUNTER — HOSPITAL ENCOUNTER (OUTPATIENT)
Dept: BEHAVIORAL HEALTH | Facility: CLINIC | Age: 38
End: 2018-04-07
Attending: FAMILY MEDICINE
Payer: COMMERCIAL

## 2018-04-07 PROCEDURE — H2035 A/D TX PROGRAM, PER HOUR: HCPCS | Mod: HQ

## 2018-04-07 PROCEDURE — 10020000 ZZH LODGING PLUS FACILITY CHARGE ADULT

## 2018-04-07 NOTE — PROGRESS NOTES
Nursing Discharge Planning Meeting    Writer completed discharge planning meeting with patient. Discharge is planned for Tuesday, April 10th to home with outpatient CD treatment.    Discussed appropriate follow up care to manage Suboxone, anxiety, depression, sleep,  and to obtain medication refills. Patient given a copy of her current medications for reference. Questions answered regarding process on the day of discharge and she verbalized understanding of post-discharge follow up plan.    Patient has an appointment with her suboxone maintenance provider at [facility] on 4/19/2018 and will obtain refills on medications at that time.     Continue to support patient in discharge planning as needed to assure appropriate continuity of care.     Tobacco Cessation  Patient participated in the nicotine replacement therapy for tobacco cessation or reduction during their treatment programming: Yes    Discussed information about remaining tobacco free in the community and was encoruaged to discuss their tobacco cessation efforts with the primary care provider.

## 2018-04-08 ENCOUNTER — HOSPITAL ENCOUNTER (OUTPATIENT)
Dept: BEHAVIORAL HEALTH | Facility: CLINIC | Age: 38
End: 2018-04-08
Attending: FAMILY MEDICINE
Payer: COMMERCIAL

## 2018-04-08 PROCEDURE — H2035 A/D TX PROGRAM, PER HOUR: HCPCS | Mod: HQ

## 2018-04-08 PROCEDURE — 10020000 ZZH LODGING PLUS FACILITY CHARGE ADULT

## 2018-04-09 ENCOUNTER — HOSPITAL ENCOUNTER (OUTPATIENT)
Dept: BEHAVIORAL HEALTH | Facility: CLINIC | Age: 38
End: 2018-04-09
Attending: FAMILY MEDICINE
Payer: COMMERCIAL

## 2018-04-09 PROCEDURE — 10020000 ZZH LODGING PLUS FACILITY CHARGE ADULT

## 2018-04-09 PROCEDURE — H2035 A/D TX PROGRAM, PER HOUR: HCPCS | Mod: HQ

## 2018-04-09 NOTE — PROGRESS NOTES
[facility] Discharge Summary/Instructions     Patient: Sonja Joe  MRN: [id number]   : 1980 Age: 37 year old Sex: female   -  Focus of Treatment / Discharge Recommendations    Personal Safety/ Management of Symptoms  * Follow your safety plan.  Report increased symptoms to your care team and /or go to the nearest Emergency Department.  * Call crisis lines as needed  [facility] [phone]                [facility] [phone]  [facility] [phone]              [facility] [phone]  [facility] [phone]              [facility] [phone]  [facility] [phone]          National Suicide Prevention [phone]  [facility] [phone]            Suicide Prevention [phone]  [facility] [phone]    Abstinence/Relapse Prevention  * Take all medicines as directed.  Carry a current list of medicines with you.  * Use coping skills: nutrition, rest, exercise, spirituality, journal, meditation, seek sober support, engage in activities you enjoy  * Do not use illicit (street) drugs, controlled substances (narcotics) or alcohol.    Develop/Improve Independent Living/Socialization Skills: ensure living environment is conducive to recovery    Community Resources/Supports and Discharge Planning:  Maintain abstinence from all mood altering substances, attend 2+ 12 step meetings per week, maintain contact with a program sponsor, enter the women's outpatient program at [facility] and follow recommendations, continue 1.1 therapy, maintain good physical and mental health care.  Follow up with psychiatrist / main caregiver: [facility]-suboxone   Next visit: 18  Follow up with your therapist: Evelyn or other referrals provided   Next visit: TBD  Go to group therapy and / or support groups at: [facility] and in home community  See your medical doctor about:  Ongoing physical health  care    Client Signature:_______________________   Date / Time:___________  Staff Signature:________________________   Date / Time:___________

## 2018-04-09 NOTE — PROGRESS NOTES
Pt presented  Boundaries for co dependents, trust and male dependency. She shared home is unpredictable and she was not sure what she was feeling were her feelings or her parents. Pt shared she is in a cycle of using and when sober from chemicals she turns to to men, when they break up she returns to chemicals. She notes as a teen she started with food.She seems to be gaining insight about herself and willing to work towards being without food, drugs or men. Pt is working to complete her relapse prevention plan and discharge tomorrow.

## 2018-04-09 NOTE — PROGRESS NOTES
[address] [place] [address]        Sonja Joe, 1980, was admitted for evaluation/treatment of chemical dependency at [facility].  This person took part in these program(s):    ______ The Inpatient Program   ______ The Outpatient Program   ___x__ The [facility]   ______ [facility] Outpatient       Date admitted: 03/13/18  Date discharged: 04/10/18    Type of discharge:   ___x__ Satisfactory - completed evaluation / treatment   ______ Discharged without completing   ______ Behavioral discharge   ______ Transferred to another chemical dependency program   ______ Transferred to another type of service   ______ Left against medical advice (AMA) / Eloped       Comments: referred to the [facility] women's outpatient program - [facility].      Counselor: OPHELIA Titus                       Date: 4/9/2018             Time: 2:09 PM

## 2018-04-10 NOTE — PROGRESS NOTES
4/10/18  Pt shared her relapse prevention plan and received a medallion from peers for program completion.  Counselor met with Pt to review her plan for ongoing care.  Pt appears prepared for discharge to the home of her parents this date.  Pt to enter the women's outpatient program at [facility] and meet with a therapist from [facility]. Pt discharged with staff approval.
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
## 2023-08-04 ENCOUNTER — APPOINTMENT (OUTPATIENT)
Dept: URBAN - METROPOLITAN AREA CLINIC 256 | Age: 43
Setting detail: DERMATOLOGY
End: 2023-08-05

## 2023-08-04 VITALS — WEIGHT: 260 LBS | HEIGHT: 70 IN

## 2023-08-04 DIAGNOSIS — L57.0 ACTINIC KERATOSIS: ICD-10-CM

## 2023-08-04 DIAGNOSIS — L57.8 OTHER SKIN CHANGES DUE TO CHRONIC EXPOSURE TO NONIONIZING RADIATION: ICD-10-CM

## 2023-08-04 PROCEDURE — 17000 DESTRUCT PREMALG LESION: CPT

## 2023-08-04 PROCEDURE — OTHER COUNSELING: OTHER

## 2023-08-04 PROCEDURE — OTHER MIPS QUALITY: OTHER

## 2023-08-04 PROCEDURE — OTHER LIQUID NITROGEN: OTHER

## 2023-08-04 PROCEDURE — OTHER ADDITIONAL NOTES: OTHER

## 2023-08-04 PROCEDURE — OTHER EDUCATIONAL RESOURCES PROVIDED: OTHER

## 2023-08-04 PROCEDURE — 99202 OFFICE O/P NEW SF 15 MIN: CPT | Mod: 25

## 2023-08-04 PROCEDURE — OTHER PHOTO-DOCUMENTATION: OTHER

## 2023-08-04 ASSESSMENT — LOCATION DETAILED DESCRIPTION DERM: LOCATION DETAILED: NASAL DORSUM

## 2023-08-04 ASSESSMENT — LOCATION ZONE DERM: LOCATION ZONE: NOSE

## 2023-08-04 ASSESSMENT — TOTAL NUMBER OF LESIONS: # OF LESIONS?: 1

## 2023-08-04 ASSESSMENT — LOCATION SIMPLE DESCRIPTION DERM: LOCATION SIMPLE: NOSE

## 2023-08-04 ASSESSMENT — PAIN INTENSITY VAS: HOW INTENSE IS YOUR PAIN 0 BEING NO PAIN, 10 BEING THE MOST SEVERE PAIN POSSIBLE?: NO PAIN

## 2023-08-04 NOTE — PROCEDURE: ADDITIONAL NOTES
Additional Notes: -Advised patient to purchase SportzBloc and use when playing Tennis or out in sun for extended period.\\n-Recommended patient get a FBSE in 8 weeks so we could also recheck the treated AK on the nose.
Render Risk Assessment In Note?: no
Detail Level: Simple

## 2023-08-04 NOTE — PROCEDURE: LIQUID NITROGEN
Post-Care Instructions: - Patient was instructed to avoid picking at any of the treated lesions.
Consent: - Discussed that these are a result of cumulative sun exposure.\\n- Verbal and written consent was obtained, and risks were reviewed prior to procedure today. \\n- Risks discussed include but are not limited to pain, crusting, scabbing, blistering, scarring, temporary or permanent darker or lighter pigmentary change, recurrence, incomplete resolution, and infection.
Render Post-Care Instructions In Note?: yes
Duration Of Freeze Thaw-Cycle (Seconds): 0
Application Tool (Optional): Liquid Nitrogen Sprayer
Detail Level: Detailed
Show Aperture Variable?: No

## 2024-01-09 NOTE — PROGRESS NOTES
Initial Services Plan        Before your first treatment group, please do the following    Immediate health & safety concerns: Go to the emergency room if you start to have withdrawal symptoms.  Look for sober housing and a supportive social network.  Look for a support network (such as AA, NA, DBT group, a Episcopalian group, etc.)    Suggestions for client during the time between intake & completion of treatment plan:  Tour your treatment center (unit or outpatient clinic).  Introduce yourself to the treatment group.  Spend time getting to know your peers.  Complete the problem list for your treatment plan.  Start drug and alcohol use history.  Review your patient or client handbook.    Client issues to be addressed in the first treatment sessions:  Identify concerns about coming into treatment, i.e. fear of failing again, sharing a room in treatment      Sherry Solis RN  3/13/2018  10:51 AM                     In an effort to ensure that our patients [employer]Well, a Team Member has reviewed your chart and identified an opportunity to provide the best care possible. An attempt was made to discuss or schedule overdue Preventive or Disease Management screening.     The Outcome was Contact was made, care gap was discussed - see further documentation. Care Gaps include Hypertension.  blood pressure   134/75 with a pluse of 65. Taken at 1:30 127/71, pluse 67.